# Patient Record
Sex: FEMALE | Race: WHITE | Employment: FULL TIME | URBAN - METROPOLITAN AREA
[De-identification: names, ages, dates, MRNs, and addresses within clinical notes are randomized per-mention and may not be internally consistent; named-entity substitution may affect disease eponyms.]

---

## 2020-03-20 ENCOUNTER — HOSPITAL ENCOUNTER (OUTPATIENT)
Dept: PREADMISSION TESTING | Age: 40
Discharge: HOME | End: 2020-03-20
Payer: COMMERCIAL

## 2020-03-20 VITALS
DIASTOLIC BLOOD PRESSURE: 86 MMHG | TEMPERATURE: 98.1 F | BODY MASS INDEX: 26.48 KG/M2 | SYSTOLIC BLOOD PRESSURE: 143 MMHG | WEIGHT: 185 LBS | HEIGHT: 70 IN | HEART RATE: 79 BPM

## 2020-03-20 LAB
ABO + RH BLD: NORMAL
ANION GAP SERPL CALC-SCNC: 6 MMOL/L (ref 5–15)
APPEARANCE UR: CLEAR
BACTERIA URNS QL MICRO: NEGATIVE /HPF
BILIRUB UR QL: NEGATIVE
BLOOD GROUP ANTIBODIES SERPL: NORMAL
BUN SERPL-MCNC: 13 MG/DL (ref 6–20)
BUN/CREAT SERPL: 20 (ref 12–20)
CALCIUM SERPL-MCNC: 9.5 MG/DL (ref 8.5–10.1)
CHLORIDE SERPL-SCNC: 104 MMOL/L (ref 97–108)
CO2 SERPL-SCNC: 27 MMOL/L (ref 21–32)
COLOR UR: ABNORMAL
CREAT SERPL-MCNC: 0.66 MG/DL (ref 0.55–1.02)
EPITH CASTS URNS QL MICRO: ABNORMAL /LPF
ERYTHROCYTE [DISTWIDTH] IN BLOOD BY AUTOMATED COUNT: 12.6 % (ref 11.5–14.5)
EST. AVERAGE GLUCOSE BLD GHB EST-MCNC: 97 MG/DL
GLUCOSE SERPL-MCNC: 80 MG/DL (ref 65–100)
GLUCOSE UR STRIP.AUTO-MCNC: NEGATIVE MG/DL
HBA1C MFR BLD: 5 % (ref 4–5.6)
HCG SERPL QL: NEGATIVE
HCT VFR BLD AUTO: 38.8 % (ref 35–47)
HGB BLD-MCNC: 13.4 G/DL (ref 11.5–16)
HGB UR QL STRIP: NEGATIVE
HYALINE CASTS URNS QL MICRO: ABNORMAL /LPF (ref 0–5)
INR PPP: 1 (ref 0.9–1.1)
KETONES UR QL STRIP.AUTO: 40 MG/DL
LEUKOCYTE ESTERASE UR QL STRIP.AUTO: NEGATIVE
MCH RBC QN AUTO: 34.3 PG (ref 26–34)
MCHC RBC AUTO-ENTMCNC: 34.5 G/DL (ref 30–36.5)
MCV RBC AUTO: 99.2 FL (ref 80–99)
NITRITE UR QL STRIP.AUTO: NEGATIVE
NRBC # BLD: 0 K/UL (ref 0–0.01)
NRBC BLD-RTO: 0 PER 100 WBC
PH UR STRIP: 6.5 [PH] (ref 5–8)
PLATELET # BLD AUTO: 289 K/UL (ref 150–400)
PMV BLD AUTO: 9.7 FL (ref 8.9–12.9)
POTASSIUM SERPL-SCNC: 3.8 MMOL/L (ref 3.5–5.1)
PROT UR STRIP-MCNC: NEGATIVE MG/DL
PROTHROMBIN TIME: 10.3 SEC (ref 9–11.1)
RBC # BLD AUTO: 3.91 M/UL (ref 3.8–5.2)
RBC #/AREA URNS HPF: ABNORMAL /HPF (ref 0–5)
SODIUM SERPL-SCNC: 137 MMOL/L (ref 136–145)
SP GR UR REFRACTOMETRY: 1.02
SPECIMEN EXP DATE BLD: NORMAL
UA: UC IF INDICATED,UAUC: ABNORMAL
UROBILINOGEN UR QL STRIP.AUTO: 1 EU/DL (ref 0.2–1)
WBC # BLD AUTO: 6 K/UL (ref 3.6–11)
WBC URNS QL MICRO: ABNORMAL /HPF (ref 0–4)

## 2020-03-20 PROCEDURE — 86900 BLOOD TYPING SEROLOGIC ABO: CPT

## 2020-03-20 PROCEDURE — 85610 PROTHROMBIN TIME: CPT

## 2020-03-20 PROCEDURE — 81001 URINALYSIS AUTO W/SCOPE: CPT

## 2020-03-20 PROCEDURE — 85027 COMPLETE CBC AUTOMATED: CPT

## 2020-03-20 PROCEDURE — 83036 HEMOGLOBIN GLYCOSYLATED A1C: CPT

## 2020-03-20 PROCEDURE — 84703 CHORIONIC GONADOTROPIN ASSAY: CPT

## 2020-03-20 PROCEDURE — 80048 BASIC METABOLIC PNL TOTAL CA: CPT

## 2020-03-20 RX ORDER — DEXTROAMPHETAMINE SACCHARATE, AMPHETAMINE ASPARTATE, DEXTROAMPHETAMINE SULFATE AND AMPHETAMINE SULFATE 5; 5; 5; 5 MG/1; MG/1; MG/1; MG/1
30 TABLET ORAL DAILY
COMMUNITY

## 2020-03-20 RX ORDER — ESCITALOPRAM OXALATE 10 MG/1
20 TABLET ORAL
COMMUNITY

## 2020-03-20 RX ORDER — LANOLIN ALCOHOL/MO/W.PET/CERES
100 CREAM (GRAM) TOPICAL DAILY
COMMUNITY
End: 2020-06-04

## 2020-03-20 RX ORDER — LOSARTAN POTASSIUM 50 MG/1
50 TABLET ORAL DAILY
COMMUNITY

## 2020-03-20 RX ORDER — BACLOFEN 20 MG
500 TABLET ORAL DAILY
COMMUNITY
End: 2020-06-04

## 2020-03-20 NOTE — PERIOP NOTES
PREOPERATIVE INSTRUCTIONS REVIEWED WITH PATIENT. PATIENT GIVEN HIBICLENS. INSTRUCTIONS [REVIEWED/   ON USE OF HIBICLENS. PATIENT GIVEN SSI INFECTION FAQ SHEET, INFORMATION SHEET ON DIABETIC TREATMENT CENTER AS WELL AS HAND WASHING TIPS SHEETS. MRSA/MSSA TREATMENT INSTRUCTION SHEET GIVEN WITH AN EXPLANATION TO PATIENT THAT THEY WILL BE NOTIFIED IF TREATMENT INSTRUCTIONS NEED TO BE INITIATED. PATIENT WAS GIVEN THE OPPORTUNITY TO ASK QUESTIONS ON THE INFORMATION PROVIDED.

## 2020-03-21 LAB
BACTERIA SPEC CULT: NORMAL
BACTERIA SPEC CULT: NORMAL
SERVICE CMNT-IMP: NORMAL

## 2020-03-23 PROBLEM — Z22.321 MSSA (METHICILLIN-SUSCEPTIBLE STAPH AUREUS) CARRIER: Status: ACTIVE | Noted: 2020-03-23

## 2020-03-23 RX ORDER — MUPIROCIN 20 MG/G
OINTMENT TOPICAL 2 TIMES DAILY
Qty: 22 G | Refills: 0 | Status: SHIPPED | OUTPATIENT
Start: 2020-03-23 | End: 2020-06-15

## 2020-03-23 NOTE — ADVANCED PRACTICE NURSE
Patient was called (identity confirmed with 2 patient identifiers) and informed of positive MSSA, instructed to obtain mupirocin prescription and Chlorhexidine liquid soap from pharmacy per protocol. Written instructions given at time of Preadmission Testing were reinforced with patient. Patient was given an opportunity to have questions answered and contact information if needed. Surgery postponed for now d/t COVID-19. Patient advised to follow above protocol prior to planned surgery date when known.

## 2020-03-23 NOTE — PERIOP NOTES
MRSA CULTURE + MSSA, CALLED TO   OF [facility] FOR DR. CRISTINA. PAT PHONE NUMBER GIVEN FOR RETURN CALL IF NEEDED.     CHART TO Mohan Ortega NP

## 2020-06-04 ENCOUNTER — HOSPITAL ENCOUNTER (OUTPATIENT)
Dept: PREADMISSION TESTING | Age: 40
Discharge: HOME | End: 2020-06-04
Payer: COMMERCIAL

## 2020-06-04 VITALS
HEART RATE: 90 BPM | HEIGHT: 70 IN | BODY MASS INDEX: 25.13 KG/M2 | WEIGHT: 175.5 LBS | DIASTOLIC BLOOD PRESSURE: 85 MMHG | RESPIRATION RATE: 20 BRPM | SYSTOLIC BLOOD PRESSURE: 128 MMHG | TEMPERATURE: 98 F

## 2020-06-04 LAB
ABO + RH BLD: NORMAL
ANION GAP SERPL CALC-SCNC: 7 MMOL/L (ref 5–15)
APPEARANCE UR: CLEAR
BACTERIA URNS QL MICRO: NEGATIVE /HPF
BILIRUB UR QL: NEGATIVE
BLOOD GROUP ANTIBODIES SERPL: NORMAL
BUN SERPL-MCNC: 13 MG/DL (ref 6–20)
BUN/CREAT SERPL: 18 (ref 12–20)
CALCIUM SERPL-MCNC: 9.8 MG/DL (ref 8.5–10.1)
CHLORIDE SERPL-SCNC: 105 MMOL/L (ref 97–108)
CO2 SERPL-SCNC: 26 MMOL/L (ref 21–32)
COLOR UR: ABNORMAL
CREAT SERPL-MCNC: 0.71 MG/DL (ref 0.55–1.02)
EPITH CASTS URNS QL MICRO: ABNORMAL /LPF
ERYTHROCYTE [DISTWIDTH] IN BLOOD BY AUTOMATED COUNT: 12.2 % (ref 11.5–14.5)
EST. AVERAGE GLUCOSE BLD GHB EST-MCNC: 97 MG/DL
GLUCOSE SERPL-MCNC: 91 MG/DL (ref 65–100)
GLUCOSE UR STRIP.AUTO-MCNC: NEGATIVE MG/DL
HBA1C MFR BLD: 5 % (ref 4–5.6)
HCG SERPL QL: NEGATIVE
HCT VFR BLD AUTO: 40.9 % (ref 35–47)
HGB BLD-MCNC: 14.6 G/DL (ref 11.5–16)
HGB UR QL STRIP: NEGATIVE
HYALINE CASTS URNS QL MICRO: ABNORMAL /LPF (ref 0–5)
INR PPP: 1 (ref 0.9–1.1)
KETONES UR QL STRIP.AUTO: NEGATIVE MG/DL
LEUKOCYTE ESTERASE UR QL STRIP.AUTO: NEGATIVE
MCH RBC QN AUTO: 34.6 PG (ref 26–34)
MCHC RBC AUTO-ENTMCNC: 35.7 G/DL (ref 30–36.5)
MCV RBC AUTO: 96.9 FL (ref 80–99)
NITRITE UR QL STRIP.AUTO: NEGATIVE
NRBC # BLD: 0 K/UL (ref 0–0.01)
NRBC BLD-RTO: 0 PER 100 WBC
PH UR STRIP: >8.5 [PH] (ref 5–8)
PLATELET # BLD AUTO: 321 K/UL (ref 150–400)
PMV BLD AUTO: 9.4 FL (ref 8.9–12.9)
POTASSIUM SERPL-SCNC: 3.8 MMOL/L (ref 3.5–5.1)
PROT UR STRIP-MCNC: NEGATIVE MG/DL
PROTHROMBIN TIME: 10.2 SEC (ref 9–11.1)
RBC # BLD AUTO: 4.22 M/UL (ref 3.8–5.2)
RBC #/AREA URNS HPF: ABNORMAL /HPF (ref 0–5)
SODIUM SERPL-SCNC: 138 MMOL/L (ref 136–145)
SP GR UR REFRACTOMETRY: 1.02 (ref 1–1.03)
SPECIMEN EXP DATE BLD: NORMAL
UA: UC IF INDICATED,UAUC: ABNORMAL
UROBILINOGEN UR QL STRIP.AUTO: 1 EU/DL (ref 0.2–1)
WBC # BLD AUTO: 7.4 K/UL (ref 3.6–11)
WBC URNS QL MICRO: ABNORMAL /HPF (ref 0–4)

## 2020-06-04 PROCEDURE — 85610 PROTHROMBIN TIME: CPT

## 2020-06-04 PROCEDURE — 83036 HEMOGLOBIN GLYCOSYLATED A1C: CPT

## 2020-06-04 PROCEDURE — 86900 BLOOD TYPING SEROLOGIC ABO: CPT

## 2020-06-04 PROCEDURE — 81001 URINALYSIS AUTO W/SCOPE: CPT

## 2020-06-04 PROCEDURE — 80048 BASIC METABOLIC PNL TOTAL CA: CPT

## 2020-06-04 PROCEDURE — 85027 COMPLETE CBC AUTOMATED: CPT

## 2020-06-04 PROCEDURE — 84703 CHORIONIC GONADOTROPIN ASSAY: CPT

## 2020-06-04 RX ORDER — FLUTICASONE PROPIONATE 50 MCG
2 SPRAY, SUSPENSION (ML) NASAL AS NEEDED
COMMUNITY

## 2020-06-04 RX ORDER — FEXOFENADINE HYDROCHLORIDE AND PSEUDOEPHEDRINE HYDROCHLORIDE 180; 240 MG/1; MG/1
1 TABLET, FILM COATED, EXTENDED RELEASE ORAL AS NEEDED
COMMUNITY
End: 2022-03-01

## 2020-06-04 NOTE — PERIOP NOTES
Preoperative instructions reviewed with patient. Patient given CHG soap. Instructions reviewed on use of CHG soap. Patient given SSI infection FAQ sheet. MRSA/MSSA treatment instruction sheet given with an explanation to patient that they will be notified if treatment instructions need to be initiated. Patient was given opportunity to ask questions on the information provided.

## 2020-06-05 LAB
BACTERIA SPEC CULT: NORMAL
BACTERIA SPEC CULT: NORMAL
SERVICE CMNT-IMP: NORMAL

## 2020-06-08 RX ORDER — CEFAZOLIN SODIUM/WATER 2 G/20 ML
2 SYRINGE (ML) INTRAVENOUS ONCE
Status: CANCELLED | OUTPATIENT
Start: 2020-06-15 | End: 2020-06-15

## 2020-06-08 NOTE — H&P
Olena Singleton  : 1980  Undefined / Language: Onur Castro / Race: White  Female      History of Present Illness   The patient is a 44year old female who presents with a complaint of MRI results. Patient presents today to discuss treatment options for her arthritic right knee.  She has had prior arthroscopy.  She has had multiple steroid and hyaluronic acid injections.  She is done an exercise program and physical therapy. [place] arthroscopy showed high-grade degeneration of her lateral knee joint.  She had a recent MRI which confirmed this. Problem List/Past Medical   REVIEW OF SYSTEMS: Systems were reviewed by the provider.    Tear of medial meniscus of right knee, sequela (905.7  S83.241S)      Allergies  Zithromax *MACROLIDES*    Allergies Reconciled      Medication History   Adderall  (Oral) Specific strength unknown - Active. Lexapro  (Oral) Specific strength unknown - Active. Losartan Potassium  (Oral) Specific strength unknown - Active. Medications Reconciled     Other Problems   Unspecified Diagnosis          Review of Systems  General Not Present- Chills and Fatigue. Skin Not Present- Bruising, Pallor and Skin Color Changes. Respiratory Not Present- Cough and Difficulty Breathing. Cardiovascular Not Present- Chest Pain, Fainting / Blacking Out and Rapid Heart Rate. Musculoskeletal Present- Joint Pain. Not Present- Decreased Range of Motion and Joint Swelling. Neurological Not Present- Dysesthesia, Paresthesias and Weakness In Extremities. Hematology Not Present- Abnormal Bleeding, Blood Clots and Petechiae. Vitals  Weight: 200 lb   Height: 70 in   Weight was reported by patient. Height was reported by patient. Body Surface Area: 2.09 m²   Body Mass Index: 28.7 kg/m²          Physical Exam  Musculoskeletal  Global Assessment  Examination of related systems reveals - well-developed, well-nourished, in no acute distress, alert and oriented x 3.  Right Lower Extremity - Note: Hip was examined and has painless range of motion with negative impingement and apprehension sign. Straight leg raise and femoral nerve stretch test are negative. Lower extremity has palpable dorsalis pedis pulse. Skin is intact and foot is sensate and well-perfused. Knee exam exhibits increased valgus compared to the contralateral side probably about 5 degrees. She has laxity of her lateral collateral based on the valgus deformity. MCL is intact. ACL PCL intact. Knee range of motion 0 to 130 degrees. Jen's testing is positive laterally. She has mild patellofemoral joint crepitation. Motor testing reveals 5 out of 5 strength of the hip flexors, quads, ankle plantar flexors, and ankle dorsiflexors. Assessment & Plan     Primary osteoarthritis of right knee (715.16  M17.11)  Impression: Valgus DJD of the right knee. Bone-on-bone. Discussed options. A biologic solution would be a osteotomy. An arthroplasty solution would be a lateral uni-. All questions were answered. She is going to get back with us if she decides to proceed with a lateral uni-. If she does I would schedule this as an arthroscopy with lateral unicompartmental arthroplasty. [facility] implant. Valgus DJD of the right knee. Bone-on-bone. Discussed options. A biologic solution would be a osteotomy. An arthroplasty solution would be a lateral uni-. All questions were answered. She is going to get back with us if she decides to proceed with a lateral uni-. If she does I would schedule this as an arthroscopy with lateral unicompartmental arthroplasty. [address] implant. Current Plans  Pt Education - How to [facility] using Patient Portal and 3rd Party Apps: discussed with patient and provided information. Pt Education - Educational materials were provided.: discussed with patient and provided information.     REVIEW OF SYSTEMS: Systems were reviewed by the provider.(V49.9)

## 2020-06-11 ENCOUNTER — HOSPITAL ENCOUNTER (OUTPATIENT)
Dept: PREADMISSION TESTING | Age: 40
Discharge: HOME | End: 2020-06-11
Payer: COMMERCIAL

## 2020-06-11 DIAGNOSIS — Z20.822 ENCOUNTER FOR LABORATORY TESTING FOR COVID-19 VIRUS: ICD-10-CM

## 2020-06-11 PROCEDURE — 87635 SARS-COV-2 COVID-19 AMP PRB: CPT

## 2020-06-12 LAB — SARS-COV-2, COV2NT: NOT DETECTED

## 2020-06-15 ENCOUNTER — ANESTHESIA EVENT (OUTPATIENT)
Dept: SURGERY | Age: 40
End: 2020-06-15
Payer: COMMERCIAL

## 2020-06-15 ENCOUNTER — ANESTHESIA (OUTPATIENT)
Dept: SURGERY | Age: 40
End: 2020-06-15
Payer: COMMERCIAL

## 2020-06-15 ENCOUNTER — HOSPITAL ENCOUNTER (OUTPATIENT)
Age: 40
Discharge: HOME HEALTH | End: 2020-06-15
Attending: ORTHOPAEDIC SURGERY | Admitting: ORTHOPAEDIC SURGERY
Payer: COMMERCIAL

## 2020-06-15 VITALS
OXYGEN SATURATION: 97 % | BODY MASS INDEX: 25.05 KG/M2 | WEIGHT: 175 LBS | HEIGHT: 70 IN | SYSTOLIC BLOOD PRESSURE: 125 MMHG | RESPIRATION RATE: 16 BRPM | DIASTOLIC BLOOD PRESSURE: 75 MMHG | HEART RATE: 82 BPM | TEMPERATURE: 98 F

## 2020-06-15 DIAGNOSIS — M17.11 PRIMARY OSTEOARTHRITIS OF RIGHT KNEE: Primary | ICD-10-CM

## 2020-06-15 DIAGNOSIS — Z96.651 S/P TOTAL KNEE ARTHROPLASTY, RIGHT: ICD-10-CM

## 2020-06-15 LAB
GLUCOSE BLD STRIP.AUTO-MCNC: 104 MG/DL (ref 65–100)
HCG UR QL: NEGATIVE
SERVICE CMNT-IMP: ABNORMAL

## 2020-06-15 PROCEDURE — C1713 ANCHOR/SCREW BN/BN,TIS/BN: HCPCS | Performed by: ORTHOPAEDIC SURGERY

## 2020-06-15 PROCEDURE — 77030000032 HC CUF TRNQT ZIMM -B: Performed by: ORTHOPAEDIC SURGERY

## 2020-06-15 PROCEDURE — 74011250636 HC RX REV CODE- 250/636: Performed by: ANESTHESIOLOGY

## 2020-06-15 PROCEDURE — 77030018846 HC SOL IRR STRL H20 ICUM -A: Performed by: ORTHOPAEDIC SURGERY

## 2020-06-15 PROCEDURE — 77030031139 HC SUT VCRL2 J&J -A: Performed by: ORTHOPAEDIC SURGERY

## 2020-06-15 PROCEDURE — 77030018673: Performed by: ORTHOPAEDIC SURGERY

## 2020-06-15 PROCEDURE — 81025 URINE PREGNANCY TEST: CPT

## 2020-06-15 PROCEDURE — 77030008462 HC STPLR SKN PROX J&J -A: Performed by: ORTHOPAEDIC SURGERY

## 2020-06-15 PROCEDURE — 77030005513 HC CATH URETH FOL11 MDII -B: Performed by: ORTHOPAEDIC SURGERY

## 2020-06-15 PROCEDURE — 77030038552 HC DRN WND MDII -A: Performed by: ORTHOPAEDIC SURGERY

## 2020-06-15 PROCEDURE — 77030011640 HC PAD GRND REM COVD -A: Performed by: ORTHOPAEDIC SURGERY

## 2020-06-15 PROCEDURE — 74011250637 HC RX REV CODE- 250/637: Performed by: ANESTHESIOLOGY

## 2020-06-15 PROCEDURE — 74011000258 HC RX REV CODE- 258: Performed by: ORTHOPAEDIC SURGERY

## 2020-06-15 PROCEDURE — 74011250636 HC RX REV CODE- 250/636: Performed by: NURSE ANESTHETIST, CERTIFIED REGISTERED

## 2020-06-15 PROCEDURE — 77030006813 HC BLD SAW RECIP STRY -C: Performed by: ORTHOPAEDIC SURGERY

## 2020-06-15 PROCEDURE — 74011000258 HC RX REV CODE- 258: Performed by: NURSE ANESTHETIST, CERTIFIED REGISTERED

## 2020-06-15 PROCEDURE — 77030014125 HC TY EPDRL BBMI -B: Performed by: ANESTHESIOLOGY

## 2020-06-15 PROCEDURE — 77030022296: Performed by: ORTHOPAEDIC SURGERY

## 2020-06-15 PROCEDURE — 77030006822 HC BLD SAW SAG BRSM -B: Performed by: ORTHOPAEDIC SURGERY

## 2020-06-15 PROCEDURE — 76210000017 HC OR PH I REC 1.5 TO 2 HR: Performed by: ORTHOPAEDIC SURGERY

## 2020-06-15 PROCEDURE — 74011000250 HC RX REV CODE- 250: Performed by: NURSE ANESTHETIST, CERTIFIED REGISTERED

## 2020-06-15 PROCEDURE — C9290 INJ, BUPIVACAINE LIPOSOME: HCPCS | Performed by: ORTHOPAEDIC SURGERY

## 2020-06-15 PROCEDURE — 74011000250 HC RX REV CODE- 250: Performed by: ANESTHESIOLOGY

## 2020-06-15 PROCEDURE — 77030010783 HC BOWL MX BN CEM J&J -B: Performed by: ORTHOPAEDIC SURGERY

## 2020-06-15 PROCEDURE — 77030006784 HC BLD SAW OSC MCRA -B: Performed by: ORTHOPAEDIC SURGERY

## 2020-06-15 PROCEDURE — 76010000172 HC OR TIME 2.5 TO 3 HR INTENSV-TIER 1: Performed by: ORTHOPAEDIC SURGERY

## 2020-06-15 PROCEDURE — 82962 GLUCOSE BLOOD TEST: CPT

## 2020-06-15 PROCEDURE — 77030002916 HC SUT ETHLN J&J -A: Performed by: ORTHOPAEDIC SURGERY

## 2020-06-15 PROCEDURE — 77030039497 HC CST PAD STERILE CHCS -A: Performed by: ORTHOPAEDIC SURGERY

## 2020-06-15 PROCEDURE — 97530 THERAPEUTIC ACTIVITIES: CPT

## 2020-06-15 PROCEDURE — C1776 JOINT DEVICE (IMPLANTABLE): HCPCS | Performed by: ORTHOPAEDIC SURGERY

## 2020-06-15 PROCEDURE — 74011250637 HC RX REV CODE- 250/637: Performed by: ORTHOPAEDIC SURGERY

## 2020-06-15 PROCEDURE — 74011250636 HC RX REV CODE- 250/636: Performed by: ORTHOPAEDIC SURGERY

## 2020-06-15 PROCEDURE — 77030040922 HC BLNKT HYPOTHRM STRY -A

## 2020-06-15 PROCEDURE — 97116 GAIT TRAINING THERAPY: CPT

## 2020-06-15 PROCEDURE — 77030027138 HC INCENT SPIROMETER -A

## 2020-06-15 PROCEDURE — 77030003601 HC NDL NRV BLK BBMI -A

## 2020-06-15 PROCEDURE — 77030008496 HC TBNG ARTHSC IRR S&N -B: Performed by: ORTHOPAEDIC SURGERY

## 2020-06-15 PROCEDURE — 99218 HC RM OBSERVATION: CPT

## 2020-06-15 PROCEDURE — 77030019905 HC CATH URETH INTMIT MDII -A: Performed by: ORTHOPAEDIC SURGERY

## 2020-06-15 PROCEDURE — 97161 PT EVAL LOW COMPLEX 20 MIN: CPT

## 2020-06-15 PROCEDURE — 77030018836 HC SOL IRR NACL ICUM -A: Performed by: ORTHOPAEDIC SURGERY

## 2020-06-15 PROCEDURE — 76060000036 HC ANESTHESIA 2.5 TO 3 HR: Performed by: ORTHOPAEDIC SURGERY

## 2020-06-15 PROCEDURE — 77030002933 HC SUT MCRYL J&J -A: Performed by: ORTHOPAEDIC SURGERY

## 2020-06-15 PROCEDURE — 77030035236 HC SUT PDS STRATFX BARB J&J -B: Performed by: ORTHOPAEDIC SURGERY

## 2020-06-15 PROCEDURE — 74011000250 HC RX REV CODE- 250

## 2020-06-15 PROCEDURE — 77030028907 HC WRP KNEE WO BGS SOLM -B

## 2020-06-15 PROCEDURE — 77030040361 HC SLV COMPR DVT MDII -B

## 2020-06-15 PROCEDURE — 77030010788: Performed by: ORTHOPAEDIC SURGERY

## 2020-06-15 PROCEDURE — 77030006783 HC BLD SAW OSC MCRA -A: Performed by: ORTHOPAEDIC SURGERY

## 2020-06-15 PROCEDURE — 77030012935 HC DRSG AQUACEL BMS -B: Performed by: ORTHOPAEDIC SURGERY

## 2020-06-15 PROCEDURE — 74011000250 HC RX REV CODE- 250: Performed by: ORTHOPAEDIC SURGERY

## 2020-06-15 DEVICE — IMPLANTABLE DEVICE
Type: IMPLANTABLE DEVICE | Site: KNEE | Status: FUNCTIONAL
Brand: PERSONA® NATURAL TIBIA®

## 2020-06-15 DEVICE — SMARTSET GHV GENTAMICIN HIGH VISCOSITY BONE CEMENT 40G
Type: IMPLANTABLE DEVICE | Site: KNEE | Status: FUNCTIONAL
Brand: SMARTSET

## 2020-06-15 DEVICE — KNEE K1 TOT HEMI STD CEM IMPL CAPPED K1 SN: Type: IMPLANTABLE DEVICE | Status: FUNCTIONAL

## 2020-06-15 DEVICE — IMPLANTABLE DEVICE
Type: IMPLANTABLE DEVICE | Site: KNEE | Status: FUNCTIONAL
Brand: PERSONA® VIVACIT-E®

## 2020-06-15 DEVICE — IMPLANTABLE DEVICE
Type: IMPLANTABLE DEVICE | Site: KNEE | Status: FUNCTIONAL
Brand: PERSONA™

## 2020-06-15 RX ORDER — OXYCODONE HYDROCHLORIDE 10 MG/1
10 TABLET ORAL
Qty: 60 TAB | Refills: 0 | Status: SHIPPED | OUTPATIENT
Start: 2020-06-15 | End: 2020-06-25

## 2020-06-15 RX ORDER — ASPIRIN 325 MG
325 TABLET, DELAYED RELEASE (ENTERIC COATED) ORAL 2 TIMES DAILY
Status: DISCONTINUED | OUTPATIENT
Start: 2020-06-15 | End: 2020-06-15 | Stop reason: HOSPADM

## 2020-06-15 RX ORDER — ONDANSETRON 2 MG/ML
4 INJECTION INTRAMUSCULAR; INTRAVENOUS AS NEEDED
Status: DISCONTINUED | OUTPATIENT
Start: 2020-06-15 | End: 2020-06-15 | Stop reason: HOSPADM

## 2020-06-15 RX ORDER — SODIUM CHLORIDE 9 MG/ML
INJECTION, SOLUTION INTRAVENOUS
Status: DISCONTINUED | OUTPATIENT
Start: 2020-06-15 | End: 2020-06-15 | Stop reason: HOSPADM

## 2020-06-15 RX ORDER — HYDROXYZINE HYDROCHLORIDE 10 MG/1
10 TABLET, FILM COATED ORAL
Status: DISCONTINUED | OUTPATIENT
Start: 2020-06-15 | End: 2020-06-15 | Stop reason: HOSPADM

## 2020-06-15 RX ORDER — ACETAMINOPHEN 500 MG
1000 TABLET ORAL EVERY 6 HOURS
Status: DISCONTINUED | OUTPATIENT
Start: 2020-06-15 | End: 2020-06-15 | Stop reason: HOSPADM

## 2020-06-15 RX ORDER — ROPIVACAINE HYDROCHLORIDE 5 MG/ML
150 INJECTION, SOLUTION EPIDURAL; INFILTRATION; PERINEURAL AS NEEDED
Status: DISCONTINUED | OUTPATIENT
Start: 2020-06-15 | End: 2020-06-15 | Stop reason: HOSPADM

## 2020-06-15 RX ORDER — SODIUM CHLORIDE, SODIUM LACTATE, POTASSIUM CHLORIDE, CALCIUM CHLORIDE 600; 310; 30; 20 MG/100ML; MG/100ML; MG/100ML; MG/100ML
INJECTION, SOLUTION INTRAVENOUS
Status: DISCONTINUED | OUTPATIENT
Start: 2020-06-15 | End: 2020-06-15 | Stop reason: HOSPADM

## 2020-06-15 RX ORDER — OXYCODONE HYDROCHLORIDE 5 MG/1
10 TABLET ORAL
Status: DISCONTINUED | OUTPATIENT
Start: 2020-06-15 | End: 2020-06-15 | Stop reason: HOSPADM

## 2020-06-15 RX ORDER — LIDOCAINE HYDROCHLORIDE 10 MG/ML
0.1 INJECTION, SOLUTION EPIDURAL; INFILTRATION; INTRACAUDAL; PERINEURAL AS NEEDED
Status: DISCONTINUED | OUTPATIENT
Start: 2020-06-15 | End: 2020-06-15 | Stop reason: HOSPADM

## 2020-06-15 RX ORDER — CEFAZOLIN SODIUM/WATER 2 G/20 ML
2 SYRINGE (ML) INTRAVENOUS EVERY 8 HOURS
Status: DISCONTINUED | OUTPATIENT
Start: 2020-06-15 | End: 2020-06-15 | Stop reason: HOSPADM

## 2020-06-15 RX ORDER — FENTANYL CITRATE 50 UG/ML
25 INJECTION, SOLUTION INTRAMUSCULAR; INTRAVENOUS
Status: DISCONTINUED | OUTPATIENT
Start: 2020-06-15 | End: 2020-06-15 | Stop reason: HOSPADM

## 2020-06-15 RX ORDER — PROPOFOL 10 MG/ML
INJECTION, EMULSION INTRAVENOUS AS NEEDED
Status: DISCONTINUED | OUTPATIENT
Start: 2020-06-15 | End: 2020-06-15 | Stop reason: HOSPADM

## 2020-06-15 RX ORDER — SODIUM CHLORIDE 0.9 % (FLUSH) 0.9 %
5-40 SYRINGE (ML) INJECTION AS NEEDED
Status: DISCONTINUED | OUTPATIENT
Start: 2020-06-15 | End: 2020-06-15 | Stop reason: HOSPADM

## 2020-06-15 RX ORDER — KETOROLAC TROMETHAMINE 30 MG/ML
30 INJECTION, SOLUTION INTRAMUSCULAR; INTRAVENOUS EVERY 6 HOURS
Status: DISCONTINUED | OUTPATIENT
Start: 2020-06-15 | End: 2020-06-15 | Stop reason: HOSPADM

## 2020-06-15 RX ORDER — NALOXONE HYDROCHLORIDE 0.4 MG/ML
0.4 INJECTION, SOLUTION INTRAMUSCULAR; INTRAVENOUS; SUBCUTANEOUS AS NEEDED
Status: DISCONTINUED | OUTPATIENT
Start: 2020-06-15 | End: 2020-06-15 | Stop reason: HOSPADM

## 2020-06-15 RX ORDER — MIDAZOLAM HYDROCHLORIDE 1 MG/ML
INJECTION, SOLUTION INTRAMUSCULAR; INTRAVENOUS AS NEEDED
Status: DISCONTINUED | OUTPATIENT
Start: 2020-06-15 | End: 2020-06-15 | Stop reason: HOSPADM

## 2020-06-15 RX ORDER — FENTANYL CITRATE 50 UG/ML
50 INJECTION, SOLUTION INTRAMUSCULAR; INTRAVENOUS AS NEEDED
Status: DISCONTINUED | OUTPATIENT
Start: 2020-06-15 | End: 2020-06-15 | Stop reason: HOSPADM

## 2020-06-15 RX ORDER — MIDAZOLAM HYDROCHLORIDE 1 MG/ML
0.5 INJECTION, SOLUTION INTRAMUSCULAR; INTRAVENOUS
Status: DISCONTINUED | OUTPATIENT
Start: 2020-06-15 | End: 2020-06-15 | Stop reason: HOSPADM

## 2020-06-15 RX ORDER — CEFAZOLIN SODIUM/WATER 2 G/20 ML
2 SYRINGE (ML) INTRAVENOUS ONCE
Status: COMPLETED | OUTPATIENT
Start: 2020-06-15 | End: 2020-06-15

## 2020-06-15 RX ORDER — SODIUM CHLORIDE 9 MG/ML
125 INJECTION, SOLUTION INTRAVENOUS CONTINUOUS
Status: DISCONTINUED | OUTPATIENT
Start: 2020-06-15 | End: 2020-06-15 | Stop reason: HOSPADM

## 2020-06-15 RX ORDER — OXYCODONE HYDROCHLORIDE 5 MG/1
5 TABLET ORAL AS NEEDED
Status: DISCONTINUED | OUTPATIENT
Start: 2020-06-15 | End: 2020-06-15 | Stop reason: HOSPADM

## 2020-06-15 RX ORDER — ROPIVACAINE HYDROCHLORIDE 5 MG/ML
INJECTION, SOLUTION EPIDURAL; INFILTRATION; PERINEURAL
Status: COMPLETED | OUTPATIENT
Start: 2020-06-15 | End: 2020-06-15

## 2020-06-15 RX ORDER — EPHEDRINE SULFATE/0.9% NACL/PF 50 MG/5 ML
SYRINGE (ML) INTRAVENOUS AS NEEDED
Status: DISCONTINUED | OUTPATIENT
Start: 2020-06-15 | End: 2020-06-15 | Stop reason: HOSPADM

## 2020-06-15 RX ORDER — PROPOFOL 10 MG/ML
INJECTION, EMULSION INTRAVENOUS
Status: DISCONTINUED | OUTPATIENT
Start: 2020-06-15 | End: 2020-06-15 | Stop reason: HOSPADM

## 2020-06-15 RX ORDER — AMOXICILLIN 250 MG
1 CAPSULE ORAL 2 TIMES DAILY
Qty: 30 TAB | Refills: 0 | Status: SHIPPED | OUTPATIENT
Start: 2020-06-15 | End: 2021-02-26

## 2020-06-15 RX ORDER — DIPHENHYDRAMINE HYDROCHLORIDE 50 MG/ML
12.5 INJECTION, SOLUTION INTRAMUSCULAR; INTRAVENOUS AS NEEDED
Status: DISCONTINUED | OUTPATIENT
Start: 2020-06-15 | End: 2020-06-15 | Stop reason: HOSPADM

## 2020-06-15 RX ORDER — ASPIRIN 325 MG
325 TABLET ORAL 2 TIMES DAILY
Qty: 60 TAB | Refills: 0 | Status: SHIPPED | OUTPATIENT
Start: 2020-06-15 | End: 2021-02-26

## 2020-06-15 RX ORDER — OXYCODONE HYDROCHLORIDE 5 MG/1
15 TABLET ORAL
Status: DISCONTINUED | OUTPATIENT
Start: 2020-06-15 | End: 2020-06-15 | Stop reason: HOSPADM

## 2020-06-15 RX ORDER — MIDAZOLAM HYDROCHLORIDE 1 MG/ML
1 INJECTION, SOLUTION INTRAMUSCULAR; INTRAVENOUS AS NEEDED
Status: DISCONTINUED | OUTPATIENT
Start: 2020-06-15 | End: 2020-06-15 | Stop reason: HOSPADM

## 2020-06-15 RX ORDER — ESCITALOPRAM OXALATE 10 MG/1
10 TABLET ORAL
Status: DISCONTINUED | OUTPATIENT
Start: 2020-06-16 | End: 2020-06-15 | Stop reason: HOSPADM

## 2020-06-15 RX ORDER — SODIUM CHLORIDE, SODIUM LACTATE, POTASSIUM CHLORIDE, CALCIUM CHLORIDE 600; 310; 30; 20 MG/100ML; MG/100ML; MG/100ML; MG/100ML
100 INJECTION, SOLUTION INTRAVENOUS CONTINUOUS
Status: DISCONTINUED | OUTPATIENT
Start: 2020-06-15 | End: 2020-06-15 | Stop reason: HOSPADM

## 2020-06-15 RX ORDER — BUPIVACAINE HYDROCHLORIDE 5 MG/ML
INJECTION, SOLUTION EPIDURAL; INTRACAUDAL
Status: COMPLETED | OUTPATIENT
Start: 2020-06-15 | End: 2020-06-15

## 2020-06-15 RX ORDER — DEXTROAMPHETAMINE SACCHARATE, AMPHETAMINE ASPARTATE, DEXTROAMPHETAMINE SULFATE AND AMPHETAMINE SULFATE 2.5; 2.5; 2.5; 2.5 MG/1; MG/1; MG/1; MG/1
20 TABLET ORAL DAILY
Status: DISCONTINUED | OUTPATIENT
Start: 2020-06-16 | End: 2020-06-15 | Stop reason: HOSPADM

## 2020-06-15 RX ORDER — SODIUM CHLORIDE, SODIUM LACTATE, POTASSIUM CHLORIDE, CALCIUM CHLORIDE 600; 310; 30; 20 MG/100ML; MG/100ML; MG/100ML; MG/100ML
1000 INJECTION, SOLUTION INTRAVENOUS CONTINUOUS
Status: DISCONTINUED | OUTPATIENT
Start: 2020-06-15 | End: 2020-06-15 | Stop reason: HOSPADM

## 2020-06-15 RX ORDER — OXYCODONE HYDROCHLORIDE 5 MG/1
10-15 TABLET ORAL
Qty: 60 TAB | Refills: 0 | Status: SHIPPED | OUTPATIENT
Start: 2020-06-15 | End: 2020-06-15

## 2020-06-15 RX ORDER — MORPHINE SULFATE 10 MG/ML
2 INJECTION, SOLUTION INTRAMUSCULAR; INTRAVENOUS
Status: DISCONTINUED | OUTPATIENT
Start: 2020-06-15 | End: 2020-06-15 | Stop reason: HOSPADM

## 2020-06-15 RX ORDER — LIDOCAINE HYDROCHLORIDE 20 MG/ML
INJECTION, SOLUTION EPIDURAL; INFILTRATION; INTRACAUDAL; PERINEURAL AS NEEDED
Status: DISCONTINUED | OUTPATIENT
Start: 2020-06-15 | End: 2020-06-15 | Stop reason: HOSPADM

## 2020-06-15 RX ORDER — SODIUM CHLORIDE 9 MG/ML
25 INJECTION, SOLUTION INTRAVENOUS CONTINUOUS
Status: DISCONTINUED | OUTPATIENT
Start: 2020-06-15 | End: 2020-06-15 | Stop reason: HOSPADM

## 2020-06-15 RX ORDER — SODIUM CHLORIDE 0.9 % (FLUSH) 0.9 %
5-40 SYRINGE (ML) INJECTION EVERY 8 HOURS
Status: DISCONTINUED | OUTPATIENT
Start: 2020-06-15 | End: 2020-06-15 | Stop reason: HOSPADM

## 2020-06-15 RX ORDER — HYDROMORPHONE HYDROCHLORIDE 1 MG/ML
0.2 INJECTION, SOLUTION INTRAMUSCULAR; INTRAVENOUS; SUBCUTANEOUS
Status: ACTIVE | OUTPATIENT
Start: 2020-06-15 | End: 2020-06-15

## 2020-06-15 RX ORDER — GLYCOPYRROLATE 0.2 MG/ML
INJECTION INTRAMUSCULAR; INTRAVENOUS
Status: COMPLETED
Start: 2020-06-15 | End: 2020-06-15

## 2020-06-15 RX ORDER — ONDANSETRON 2 MG/ML
INJECTION INTRAMUSCULAR; INTRAVENOUS AS NEEDED
Status: DISCONTINUED | OUTPATIENT
Start: 2020-06-15 | End: 2020-06-15 | Stop reason: HOSPADM

## 2020-06-15 RX ORDER — ACETAMINOPHEN 325 MG/1
650 TABLET ORAL ONCE
Status: COMPLETED | OUTPATIENT
Start: 2020-06-15 | End: 2020-06-15

## 2020-06-15 RX ORDER — ASPIRIN 325 MG
325 TABLET, DELAYED RELEASE (ENTERIC COATED) ORAL 2 TIMES DAILY
Qty: 60 TAB | Refills: 0 | Status: SHIPPED | OUTPATIENT
Start: 2020-06-15 | End: 2021-02-26

## 2020-06-15 RX ORDER — POLYETHYLENE GLYCOL 3350 17 G/17G
17 POWDER, FOR SOLUTION ORAL DAILY
Status: DISCONTINUED | OUTPATIENT
Start: 2020-06-16 | End: 2020-06-15 | Stop reason: HOSPADM

## 2020-06-15 RX ORDER — ONDANSETRON 2 MG/ML
4 INJECTION INTRAMUSCULAR; INTRAVENOUS
Status: DISCONTINUED | OUTPATIENT
Start: 2020-06-15 | End: 2020-06-15 | Stop reason: HOSPADM

## 2020-06-15 RX ORDER — GLYCOPYRROLATE 0.2 MG/ML
INJECTION INTRAMUSCULAR; INTRAVENOUS AS NEEDED
Status: DISCONTINUED | OUTPATIENT
Start: 2020-06-15 | End: 2020-06-15 | Stop reason: HOSPADM

## 2020-06-15 RX ORDER — FACIAL-BODY WIPES
10 EACH TOPICAL DAILY PRN
Status: DISCONTINUED | OUTPATIENT
Start: 2020-06-17 | End: 2020-06-15 | Stop reason: HOSPADM

## 2020-06-15 RX ORDER — GLYCOPYRROLATE 0.2 MG/ML
0.2 INJECTION INTRAMUSCULAR; INTRAVENOUS ONCE
Status: COMPLETED | OUTPATIENT
Start: 2020-06-15 | End: 2020-06-15

## 2020-06-15 RX ORDER — DEXAMETHASONE SODIUM PHOSPHATE 4 MG/ML
INJECTION, SOLUTION INTRA-ARTICULAR; INTRALESIONAL; INTRAMUSCULAR; INTRAVENOUS; SOFT TISSUE AS NEEDED
Status: DISCONTINUED | OUTPATIENT
Start: 2020-06-15 | End: 2020-06-15 | Stop reason: HOSPADM

## 2020-06-15 RX ORDER — AMOXICILLIN 250 MG
1 CAPSULE ORAL 2 TIMES DAILY
Status: DISCONTINUED | OUTPATIENT
Start: 2020-06-15 | End: 2020-06-15 | Stop reason: HOSPADM

## 2020-06-15 RX ORDER — EPHEDRINE SULFATE/0.9% NACL/PF 50 MG/5 ML
5 SYRINGE (ML) INTRAVENOUS AS NEEDED
Status: DISCONTINUED | OUTPATIENT
Start: 2020-06-15 | End: 2020-06-15 | Stop reason: HOSPADM

## 2020-06-15 RX ADMIN — GLYCOPYRROLATE 0.1 MG: 0.2 INJECTION, SOLUTION INTRAMUSCULAR; INTRAVENOUS at 09:48

## 2020-06-15 RX ADMIN — PROPOFOL 75 MCG/KG/MIN: 10 INJECTION, EMULSION INTRAVENOUS at 09:30

## 2020-06-15 RX ADMIN — Medication 5 MG: at 10:31

## 2020-06-15 RX ADMIN — SODIUM CHLORIDE: 900 INJECTION, SOLUTION INTRAVENOUS at 11:22

## 2020-06-15 RX ADMIN — Medication 5 MG: at 10:43

## 2020-06-15 RX ADMIN — ASPIRIN 325 MG: 325 TABLET, DELAYED RELEASE ORAL at 17:09

## 2020-06-15 RX ADMIN — SODIUM CHLORIDE, POTASSIUM CHLORIDE, SODIUM LACTATE AND CALCIUM CHLORIDE 1000 ML: 600; 310; 30; 20 INJECTION, SOLUTION INTRAVENOUS at 07:57

## 2020-06-15 RX ADMIN — PROPOFOL 30 MG: 10 INJECTION, EMULSION INTRAVENOUS at 09:45

## 2020-06-15 RX ADMIN — CEFAZOLIN SODIUM 2 G: 300 INJECTION, POWDER, LYOPHILIZED, FOR SOLUTION INTRAVENOUS at 17:09

## 2020-06-15 RX ADMIN — TRANEXAMIC ACID 1 G: 100 INJECTION, SOLUTION INTRAVENOUS at 09:50

## 2020-06-15 RX ADMIN — KETOROLAC TROMETHAMINE 30 MG: 30 INJECTION, SOLUTION INTRAMUSCULAR at 15:05

## 2020-06-15 RX ADMIN — SODIUM CHLORIDE, POTASSIUM CHLORIDE, SODIUM LACTATE AND CALCIUM CHLORIDE: 600; 310; 30; 20 INJECTION, SOLUTION INTRAVENOUS at 09:28

## 2020-06-15 RX ADMIN — Medication 5 MG: at 10:55

## 2020-06-15 RX ADMIN — MIDAZOLAM HYDROCHLORIDE 1 MG: 2 INJECTION, SOLUTION INTRAMUSCULAR; INTRAVENOUS at 08:21

## 2020-06-15 RX ADMIN — MIDAZOLAM 2 MG: 1 INJECTION INTRAMUSCULAR; INTRAVENOUS at 09:30

## 2020-06-15 RX ADMIN — OXYCODONE HYDROCHLORIDE 10 MG: 5 TABLET ORAL at 17:09

## 2020-06-15 RX ADMIN — DEXAMETHASONE SODIUM PHOSPHATE 4 MG: 4 INJECTION, SOLUTION INTRAMUSCULAR; INTRAVENOUS at 09:58

## 2020-06-15 RX ADMIN — GLYCOPYRROLATE 0.2 MG: 0.2 INJECTION INTRAMUSCULAR; INTRAVENOUS at 13:05

## 2020-06-15 RX ADMIN — Medication 2 G: at 09:45

## 2020-06-15 RX ADMIN — BUPIVACAINE HYDROCHLORIDE 10 MG: 5 INJECTION, SOLUTION EPIDURAL; INTRACAUDAL; PERINEURAL at 09:39

## 2020-06-15 RX ADMIN — ACETAMINOPHEN 650 MG: 325 TABLET ORAL at 07:56

## 2020-06-15 RX ADMIN — PROPOFOL 50 MG: 10 INJECTION, EMULSION INTRAVENOUS at 11:52

## 2020-06-15 RX ADMIN — ONDANSETRON HYDROCHLORIDE 4 MG: 2 INJECTION, SOLUTION INTRAMUSCULAR; INTRAVENOUS at 09:58

## 2020-06-15 RX ADMIN — PROPOFOL 30 MG: 10 INJECTION, EMULSION INTRAVENOUS at 10:05

## 2020-06-15 RX ADMIN — FENTANYL CITRATE 50 MCG: 50 INJECTION INTRAMUSCULAR; INTRAVENOUS at 08:21

## 2020-06-15 RX ADMIN — MIDAZOLAM HYDROCHLORIDE 1 MG: 2 INJECTION, SOLUTION INTRAMUSCULAR; INTRAVENOUS at 08:22

## 2020-06-15 RX ADMIN — SODIUM CHLORIDE 125 ML/HR: 900 INJECTION, SOLUTION INTRAVENOUS at 12:15

## 2020-06-15 RX ADMIN — GLYCOPYRROLATE 0.2 MG: 0.2 INJECTION, SOLUTION INTRAMUSCULAR; INTRAVENOUS at 13:05

## 2020-06-15 RX ADMIN — PROPOFOL 50 MG: 10 INJECTION, EMULSION INTRAVENOUS at 11:51

## 2020-06-15 RX ADMIN — ACETAMINOPHEN 1000 MG: 500 TABLET ORAL at 15:04

## 2020-06-15 RX ADMIN — ROPIVACAINE HYDROCHLORIDE 30 ML: 5 INJECTION, SOLUTION EPIDURAL; INFILTRATION; PERINEURAL at 09:04

## 2020-06-15 RX ADMIN — PROPOFOL 30 MG: 10 INJECTION, EMULSION INTRAVENOUS at 09:35

## 2020-06-15 RX ADMIN — LIDOCAINE HYDROCHLORIDE 40 MG: 20 INJECTION, SOLUTION EPIDURAL; INFILTRATION; INTRACAUDAL; PERINEURAL at 09:30

## 2020-06-15 NOTE — PROGRESS NOTES
Physical Therapy    PHYSICAL THERAPY EVALUATION/DISCHARGE  Patient: Carli Gaston (41 y.o. female)  Date: 6/15/2020  Primary Diagnosis: Osteoarthritis of right knee [M17.11]  Osteoarthritis of right knee [M17.11]  Procedure(s) (LRB):  RIGHT TOTAL KNEE ARTHROPLASTY (Right) Day of Surgery   Precautions:   Fall, WBAT      ASSESSMENT  Based on the objective data described below, the patient presents with  impairment in functional mobility, activity tolerance and balance s/p R TKA. PLOF: Independent with ADLs and IADLs. Lives with significant other in one story handicapped accessible home with ramps to enter. Patient is cleared for discharge from PT standpoint. Patient instructed NOT to get up from bed, chair or commode without calling for assistance. Initiated post-TKA  Exercise protocol and patient has same in written, illustrated form. Discharge instructions with regards to post-op home program reviewed. Patient and demonstrated understanding. Functional Outcome Measure: The patient scored 80/100 on the Barthel outcome measure which is indicative of minimal impaired ability to care for basic self-needs/dependency on others. Other factors to consider for discharge: Motivated/A & O x 4/Supportive Family/Independent PLOF     Further skilled acute physical therapy is not indicated at this time. PLAN :  Recommendation for discharge: (in order for the patient to meet his/her long term goals)  Physical therapy at least 2 days/week in the home     This discharge recommendation:  Has been made in collaboration with the attending provider and/or case management    IF patient discharges home will need the following DME: patient owns DME required for discharge       SUBJECTIVE:   Patient stated I want to go home today. 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 Personal factors and/or comorbidities impacting plan of care: Motivated/A & O x 4/Supportive Family/Independent PLOF    Home Situation  Home Environment: Private residence  # Steps to Enter: 0  Wheelchair Ramp: No  One/Two Story Residence: One story  Living Alone: No  Support Systems: Spouse/Significant Other/Partner  Patient Expects to be Discharged to[de-identified] Private residence  Current DME Used/Available at Home: Niya Ilan, rolling  Tub or Shower Type: Tub/Shower combination    EXAMINATION/PRESENTATION/DECISION MAKING:   Critical Behavior:  Neurologic State: Appropriate for age  Orientation Level: Oriented X4  Cognition: Appropriate for age attention/concentration       Range Of Motion:  AROM: Generally decreased, functional           PROM: Generally decreased, functional           Strength:    Strength: Generally decreased, functional                    Tone & Sensation:   Tone: Normal              Sensation: Intact               Coordination:  Coordination: Within functional limits  Vision:      Functional Mobility:  Bed Mobility:  Rolling: Supervision  Supine to Sit: Supervision  Sit to Supine: Supervision  Scooting: Supervision  Transfers:  Sit to Stand: Supervision  Stand to Sit: Supervision                       Balance:   Sitting: Intact  Standing: Impaired; Without support  Standing - Static: Good;Constant support  Standing - Dynamic : Good;Constant support  Ambulation/Gait Training:  Distance (ft): 75 Feet (ft)  Assistive Device: Walker, rolling;Gait belt  Ambulation - Level of Assistance: Stand-by assistance        Gait Abnormalities: Antalgic  Right Side Weight Bearing: As tolerated     Base of Support: Widened;Shift to left  Stance: Right decreased  Speed/Brigette: Slow  Step Length: Left shortened  Swing Pattern: Right asymmetrical                  Stairs: Therapeutic Exercises: Ankle Pumps  Quad sets (5 second hold)  X 10 reps every hour   Heel slides x 10    Functional Measure:  Barthel Index:    Bathin  Bladder: 10  Bowels: 10  Groomin  Dressin  Feeding: 10  Mobility: 15  Stairs: 0  Toilet Use: 10  Transfer (Bed to Chair and Back): 15  Total: 80/100       The Barthel ADL Index: Guidelines  1. The index should be used as a record of what a patient does, not as a record of what a patient could do. 2. The main aim is to establish degree of independence from any help, physical or verbal, however minor and for whatever reason. 3. The need for supervision renders the patient not independent. 4. A patient's performance should be established using the best available evidence. Asking the patient, friends/relatives and nurses are the usual sources, but direct observation and common sense are also important. However direct testing is not needed. 5. Usually the patient's performance over the preceding 24-48 hours is important, but occasionally longer periods will be relevant. 6. Middle categories imply that the patient supplies over 50 per cent of the effort. 7. Use of aids to be independent is allowed. Gibran Verma., Barthel, D.WSteve (2951). Functional evaluation: the Barthel Index. [address] [facility] (14)2. LUIS Bean, Ian Toure., Theodora Yu., [place], [address] [place] [address] (). Measuring the change indisability after inpatient rehabilitation; comparison of the responsiveness of the Barthel Index and Functional [place] Measure. Journal of Neurology, Neurosurgery, and Psychiatry, 66(4), 282-342.   QUYEN Gatica, Mark op LINCOLN [place], Martha Gramajo M.A. (2004.) Assessment of post-stroke quality of life in cost-effectiveness studies: The usefulness of the Barthel Index and the EuroQoL-5D. Quality of Life Research, 15, 134-46          Physical Therapy Evaluation Charge Determination   History Examination Presentation Decision-Making   LOW Complexity : Zero comorbidities / personal factors that will impact the outcome / POC LOW Complexity : 1-2 Standardized tests and measures addressing body structure, function, activity limitation and / or participation in recreation  LOW Complexity : Stable, uncomplicated  LOW Complexity : FOTO score of       Based on the above components, the patient evaluation is determined to be of the following complexity level: LOW     Pain Ratin/10    Activity Tolerance:   Good  Please refer to the flowsheet for vital signs taken during this treatment. Vitals:    06/15/20 1358 06/15/20 1502 06/15/20 1510 06/15/20 1527   BP: 121/78 (!) 135/92 134/79 131/82   BP 1 Location: Right arm Right arm Right arm Right arm   BP Patient Position: Post activity At rest At rest;Head of bed elevated (Comment degrees); Other (comment)  Comment: 35 degrees During activity; Sitting   Pulse: 69 85 80 82   Resp: 16 16 15    Temp: 98 °F (36.7 °C) 97.9 °F (36.6 °C)     SpO2: 96% 97% 98%    Weight:       Height:         After treatment patient left in no apparent distress:   Supine in bed, Call bell within reach, Side rails x 3 and nurse notified. COMMUNICATION/EDUCATION:   The patients plan of care was discussed with: Registered nurse. Fall prevention education was provided and the patient/caregiver indicated understanding., Patient/family have participated as able in goal setting and plan of care. and Patient/family agree to work toward stated goals and plan of care.     Thank you for this referral.  Nellie Treviño   Time Calculation: 30 mins

## 2020-06-15 NOTE — DISCHARGE INSTRUCTIONS
Discharge Instructions Knee Replacement  Dr. Linnette Anderson    Patient Name: Korey Toney  Date of procedure: 6/15/2020   Procedure: Procedure(s):  RIGHT TOTAL KNEE ARTHROPLASTY  Surgeon: Radha Guevara) and Role:     * Chesley Ahumada, MD - Primary  PCP: Sabine Maria MD  Date of discharge: 6/15/20      Follow up appointments   Follow up with Dr. Linnette Anderson in 3 weeks. Call [phone] to make an appointment.  If home health has been arranged for you the agency will contact you to arrange dates/times for visits. Please call them if you do not hear from them within 24 hours after you are discharged    When to call your Orthopaedic Surgeon: Call [phone]. If you call after 5pm or on a weekend, the on call physician will be contacted   Unrelieved pain   Signs of infection-if your incision is red; continues to have drainage; drainage has a foul odor or if you have a persistent fever over 101 degrees   Signs of a blood clot in your leg-calf pain, tenderness, redness, swelling of lower leg    When to call your Primary Care Physician:   Concerns about medical conditions such as diabetes, high blood pressure, asthma, congestive heart failure   Call if blood sugars are elevated, persistent headache or dizziness, coughing or congestion, constipation or diarrhea, burning with urination, abnormal heart rate    When to call [id number] go to the nearest emergency room   Sudden onset of chest pain, shortness of breath, difficulty breathing    Activity   Weight bearing as tolerated with walker or crutches. Refer to your patient handbook for instructions and pictures   Complete your Home Exercise Program daily as instructed by your therapist.  Refer to your handbook for instructions and pictures   Get up every one hour and walk (except at night when sleeping)   Do not drive or operate heavy machinery    Incision Care   The Aquacel (brown, waterproof) surgical dressing is to remain on your knee for 7 days.  On the 7th day have someone gently peel the dressing off by carefully lifting the edge and stretching it slightly to break the adhesive seal and leave incision open to air. You may take a shower with the Aquacel dressing in place.  You  have staples in your knee incision; they will be removed by the [facility] staff in 14 days and steri-strips will be applied. Leave the steri-strips on until they fall off   Once the Aquacel is removed, you may get your incision wet but do not submerge your incision under water in a bath tub, hot tub or swimming pool for 6 weeks after surgery. Preventing blood clots    Take Aspirin 325 mg twice a day for 1 month post op as prescribed. Pain management   Keep ice wrap in place except when walking; changing gel packs every 4 hours   Lie down and elevate your leg on pillows for about 30 minutes after walking to decrease swelling and pain   Do ankle pumps (10 repetitions) every hour while awake and get up frequently to walk   Take narcotic pain pill as prescribed if needed. Take with food; avoid alcohol while taking pain medication. Decrease the amount of narcotic pain medication as your pain lessens   Do not take any over-the-counter medication except for Tylenol (acetaminophen). Please be aware that many medications contain Tylenol. We do not want you to take too much Tylenol so please use this as your guide:  o Do not take more than 3 Grams of Tylenol in a 24 hour period. (There are 1000 milligrams in one Gram  o 325mg of Tylenol per tablet (do not take more than 9 tablets in 24 hours)  o 500mg of Tylenol per tablet (do not take more than 6 tablets in 24 hours)    Diet   Resume usual diet; drink plenty of fluids; eat foods high in fiber   Take over-the-counter stool softeners and laxatives to prevent constipation.

## 2020-06-15 NOTE — PROGRESS NOTES
[facility] did not accept this pt for home health. CM sent a referral to [facility]. Will follow.  Mitch Robertson

## 2020-06-15 NOTE — PERIOP NOTES
Patient pre-assessment performed in holding. Pt confirmed surgical procedure and laterality on consent, allergies, and appropriate NPO status. Allowed time for questions and concerns.

## 2020-06-15 NOTE — OP NOTES
Name: Marybeth Lorenz  MRN:  [id number]  : 1980  Age:  36 y.o. Surgery Date: 6/15/2020      OPERATIVE REPORT - RIGHT TOTAL KNEE REPLACEMENT    PREOPERATIVE DIAGNOSIS: Osteoarthritis, right knee. POSTOPERATIVE DIAGNOSIS: Osteoarthritis, right knee. PROCEDURE PERFORMED: Right total knee arthroplasty. SURGEON: Elizabeth Montiel MD    FIRST ASSISTANT:  Giles Farfan    ANESTHESIA: Spinal    PRE-OP ANTIBIOTIC: Ancef 2g    COMPLICATIONS: None. ESTIMATED BLOOD LOSS: 50 mL. SPECIMENS REMOVED: None. COMPONENTS IMPLANTED:   Implant Name Type Inv. Item Serial No.  Lot No. LRB No. Used Action   CEMENT BNE GENTAMC GHV 40GM -- SMARTSET - SNA  CEMENT BNE GENTAMC GHV 40GM -- SMARTSET NA [employer] [id number] Right 2 Implanted   SCR BNE ROE HEX FEM 2.5X25MM -- PERSONA - SNA  SCR BNE ROE HEX FEM 2.5X25MM -- PERSONA NA [employer] [id number] Right 1 Implanted   SCR BNE ROE HEX FEM 2.5X25MM -- PERSONA - SNA  SCR BNE ROE HEX FEM 2.5X25MM -- PERSONA NA [employer] [id number] Right 1 Implanted   The PersonalCaptimo Knee System Femur Cemented Cruciate Retaining (CR) Joint Component  NA [employer] [id number] Right 1 Implanted   TIB PRN NP STM 5 DEG SZ FR -- PERSONA - SNA  TIB PRN NP STM 5 DEG SZ FR -- PERSONA NA [employer] [id number] Right 1 Implanted   PAT PSN VE POLY 35MM -- PERSONA - SNA  PAT PSN VE POLY 35MM -- PERSONA NA [employer] [id number] Right 1 Implanted   INSERT ASF CR 10MM VE R 3-11EF -- PERSONA - SNA  INSERT ASF CR 10MM VE R 3-11EF -- PERSONA NA [employer] [id number] Right 1 Implanted       INDICATIONS: The patient is an 36 yrs female with progressive debilitating right knee pain due to severe osteoarthritis. Symptoms have progressed despite comprehensive conservative treatment and the patient presents for right total knee replacement. Risks, benefits, alternatives of the procedure were reviewed in detail and the patient elects to proceed.  The patient understands the risk for perioperative medical complications. DESCRIPTION OF PROCEDURE: Spinal anesthesia was initiated. Preoperative dose of antibiotic was given. Pacheco catheter was not placed. The right lower extremity was prepped and draped in the usual sterile fashion. The skin was covered with Ioban occlusive dressing. The right lower extremity was exsanguinated. Arthroscopy was performed on the right knee which showed end stage PF arthritis. Need for TKA was decided. A medial parapatellar incision was made to the right knee. The right knee was exposed and the distal femur was cut at 5 degrees distal femoral valgus. Femur was sized for a size 10. An AP cutting block was placed, rotated based on bony landmarks. Femoral cuts were completed for a size 10. The tibia was subluxed and a neutral varus/valgus proximal tibial cut was made matching the patient's slope. The meniscal remnants were removed. The posterior osteophytes were removed from the femur. Gaps were examined. The flexion and extension gaps were 10 mm. The femur was finished for a 10, tibia for a F. Trials were placed. Patella was cut and a 35 mm trial was fitted . The knee tracked well with all trial implants. The trials were then removed. Bony surfaces were drilled, lavaged, and dried. All components were cemented. Excess cement was removed. A 10 polyethylene component was placed. After the cement had fully cured, the knee was ranged and  irrigated copiously with pulsatile lavage. All surrounding soft tissues were infiltrated with local anesthetic. The arthrotomy was closed with #2 Vicryl sutures and 0 Vicryl sutures. Skin and subcutaneous tissues were irrigated and closed in standard fashion. Sterile dressing was applied. There were no complications. The procedure was a RIGHT TOTAL KNEE REPLACEMENT. A Quinton total knee construct was utilized. No specimens were obtained/sent. The patient was transferred to the recovery room in stable condition.     Anabel Bell was critical throughout the case to assist with positioning, retraction and closure. There were no other available residents or surgical assistants to assist during this procedure.       Maggy Ellington MD

## 2020-06-15 NOTE — ANESTHESIA PREPROCEDURE EVALUATION
Relevant Problems   No relevant active problems       Anesthetic History   No history of anesthetic complications            Review of Systems / Medical History  Patient summary reviewed, nursing notes reviewed and pertinent labs reviewed    Pulmonary          Smoker  Asthma        Neuro/Psych         Psychiatric history     Cardiovascular    Hypertension                   GI/Hepatic/Renal     GERD      PUD     Endo/Other        Arthritis     Other Findings            Physical Exam    Airway  Mallampati: II  TM Distance: > 6 cm  Neck ROM: normal range of motion   Mouth opening: Normal     Cardiovascular  Regular rate and rhythm,  S1 and S2 normal,  no murmur, click, rub, or gallop             Dental  No notable dental hx       Pulmonary  Breath sounds clear to auscultation               Abdominal  GI exam deferred       Other Findings            Anesthetic Plan    ASA: 2  Anesthesia type: spinal      Post-op pain plan if not by surgeon: peripheral nerve block single      Anesthetic plan and risks discussed with: Patient

## 2020-06-15 NOTE — PROGRESS NOTES
[facility] unable to set up home health for this pt. CM messaged NP Sabas Left and asked if he can write a prescription for outpt PT. He said that he will see pt prior to d/c.  Tracee Thomas

## 2020-06-15 NOTE — PROGRESS NOTES
Problem: Falls - Risk of  Goal: *Absence of Falls  Description: Document Merritt Mcnamara Fall Risk and appropriate interventions in the flowsheet.   Outcome: Progressing Towards Goal  Note: Fall Risk Interventions:  Mobility Interventions: PT Consult for mobility concerns    Mentation Interventions: Door open when patient unattended    Medication Interventions: Patient to call before getting OOB    Elimination Interventions: Call light in reach

## 2020-06-15 NOTE — ROUTINE PROCESS
Patient: Gale Sotomayor MRN: [id number]  SSN: [id number] YOB: 1980  Age: 36 y.o. Sex: female Patient is status post Procedure(s): RIGHT TOTAL KNEE ARTHROPLASTY. Surgeon(s) and Role: Teresa Wylie MD - Primary Local/Dose/Irrigation:  SEE MAR Peripheral IV 06/15/20 Left Hand (Active) Dressing/Packing:  Wound Knee Right-Dressing Type: Staples; Aquacel; Cast padding;Elastic bandage (06/15/20 1115) Splint/Cast:  ] Other:

## 2020-06-15 NOTE — PROGRESS NOTES
MAURICIO spoke with Moe Shaver with [facility] and she stated that they do not use [employer]. Per her request, MAURICIO faxed this referral to [phone]. She will call this CM when she accepts pt.  Bladimir Cuevas

## 2020-06-15 NOTE — PROGRESS NOTES
Reason for Admission:   Right total knee arthroplasty                   RUR Score:     Not available. Plan for utilizing home health:    Yes      PCP: First and Last name:  David Blanco   Name of Practice:    Are you a current patient: Yes/No: yes Approximate date of last visit: last month. Can you participate in a virtual visit with your PCP: yes                    Current Advanced Directive/Advance Care Plan: Not on file. This pt is not currently interested in pursuing an AMD.                         Transition of Care Plan:       CM met with pt to introduce her to the role of CM . This pt lives at home with her boyfriend. She said that she was independent prior to admission. She will be going home today if she is cleared by PT. CM informed pt that she has home health orders and offered her freedom of choice. She stated that she would like to use [facility]. Andrew sent a referral to [facility] via [employer]. MARGARITA Kumari,[facility]-    Observation notice provided in writing to patient and/or caregiver as well as verbal explanation of the policy. Patients who are in outpatient status also receive the Observation notice. Care Management Interventions  PCP Verified by CM:  Yes  Palliative Care Criteria Met (RRAT>21 & CHF Dx)?: No  Transition of Care Consult (CM Consult): Discharge Planning  MyChart Signup: No  Discharge Durable Medical Equipment: No  Physical Therapy Consult: Yes  Occupational Therapy Consult: No  Speech Therapy Consult: No  Current Support Network: Lives Alone(Pt lives with her boyfriend.)  Confirm Follow Up Transport: Family  The Plan for Transition of Care is Related to the Following Treatment Goals : safe d/c  The Patient and/or Patient Representative was Provided with a Choice of Provider and Agrees with the Discharge Plan?: Yes  Freedom of Choice List was Provided with Basic Dialogue that Supports the Patient's Individualized Plan of Care/Goals, Treatment Preferences and Shares the Quality Data Associated with the Providers?: Yes  The [employer] Information Provided?: No

## 2020-06-15 NOTE — PROGRESS NOTES
Paged Eveline Adkins. Case management unable to secure home health.  Left a message with Eveline Adkins.

## 2020-06-15 NOTE — ANESTHESIA POSTPROCEDURE EVALUATION
Post-Anesthesia Evaluation and Assessment    Patient: Chasity Aguilar MRN: [id number]  SSN: [id number]    YOB: 1980  Age: 36 y.o. Sex: female      I have evaluated the patient and they are stable and ready for discharge from the PACU. Cardiovascular Function/Vital Signs  Visit Vitals  /61   Pulse (!) 48   Temp 36.4 °C (97.5 °F)   Resp 15   Ht 5' 10\" (1.778 m)   Wt 79.4 kg (175 lb)   SpO2 100%   BMI 25.11 kg/m²       Patient is status post Spinal anesthesia for Procedure(s):  RIGHT TOTAL KNEE ARTHROPLASTY. Nausea/Vomiting: None    Postoperative hydration reviewed and adequate. Pain:  Pain Scale 1: Numeric (0 - 10) (06/15/20 1210)  Pain Intensity 1: 0 (06/15/20 1210)   Managed    Neurological Status:   Neuro (WDL): Exceptions to WDL (06/15/20 1210)  Neuro  Neurologic State: Appropriate for age;Eyes open spontaneously; Eyes open to voice (06/15/20 1210)  LUE Motor Response: Purposeful (06/15/20 1210)  LLE Motor Response: Pharmacologically paralyzed;Numbness (06/15/20 1210)  RUE Motor Response: Purposeful (06/15/20 1210)  RLE Motor Response: Purposeful;Numbness (06/15/20 1210)   At baseline    Mental Status, Level of Consciousness: Alert and  oriented to person, place, and time    Pulmonary Status:   O2 Device: Nasal cannula (06/15/20 1210)   Adequate oxygenation and airway patent    Complications related to anesthesia: None    Post-anesthesia assessment completed. No concerns    Signed By: Nidia Webster MD     Pricilla 15, 2020              Procedure(s):  RIGHT TOTAL KNEE ARTHROPLASTY. spinal    <BSHSIANPOST>    INITIAL Post-op Vital signs:   Vitals Value Taken Time   /77 6/15/2020 12:45 PM   Temp 36.4 °C (97.5 °F) 6/15/2020 12:10 PM   Pulse 44 6/15/2020 12:48 PM   Resp 15 6/15/2020 12:48 PM   SpO2 100 % 6/15/2020 12:48 PM   Vitals shown include unvalidated device data.

## 2020-06-15 NOTE — PROGRESS NOTES
Bedside shift change report given to Judy (oncoming nurse) by Lesley Appiah (offgoing nurse). Report included the following information SBAR, Kardex, Intake/Output and MAR.

## 2020-06-15 NOTE — ANESTHESIA PROCEDURE NOTES
Spinal Block    Performed by: Gunnar Yancey CRNA  Authorized by: Matt Joseph MD     Pre-procedure:   Indications: at surgeon's request, post-op pain management, procedure for pain and primary anesthetic  Preanesthetic Checklist: patient identified, risks and benefits discussed, anesthesia consent, site marked, patient being monitored and timeout performed    Timeout Time: 09:35          Spinal Block:   Patient Position:  Seated  Prep Region:  Lumbar  Prep: chlorhexidine      Location:  L3-4  Technique:  Single shot        Needle:   Needle Type:  Pencan  Needle Gauge:  24 G  Attempts:  2      Events: CSF confirmed, no blood with aspiration and no paresthesia        Assessment:  Insertion:  Uncomplicated  Patient tolerance:  Patient tolerated the procedure well with no immediate complications

## 2020-06-15 NOTE — PROGRESS NOTES
The Joint Replacement Discharge Education video was reviewed by the patient/family. The content was discussed utilizing teach back, questions were answered. The patient verbalized an understanding of the instructions. Patient was given instructions, prescriptions, and extra dressings. Patient acknowledged understanding of everything.

## 2020-06-15 NOTE — ANESTHESIA PROCEDURE NOTES
Peripheral Block    Start time: 6/15/2020 8:22 AM  End time: 6/15/2020 8:30 AM  Performed by: Makenzie Saleem MD  Authorized by: Makenzie Saleem MD       Pre-procedure: Indications: at surgeon's request and post-op pain management    Preanesthetic Checklist: patient identified, risks and benefits discussed, site marked, timeout performed, anesthesia consent given and patient being monitored    Timeout Time: 08:22          Block Type:   Block Type:   Adductor canal  Laterality:  Right  Monitoring:  Standard ASA monitoring, continuous pulse ox, frequent vital sign checks, heart rate, responsive to questions and oxygen  Injection Technique:  Single shot  Procedures: ultrasound guided    Patient Position: supine  Prep: chlorhexidine    Location:  Mid thigh  Needle Type:  Stimuplex  Needle Gauge:  22 G  Needle Localization:  Ultrasound guidance    Assessment:  Number of attempts:  1  Injection Assessment:  Incremental injection every 5 mL, local visualized surrounding nerve on ultrasound, negative aspiration for blood, no paresthesia, no intravascular symptoms and negative aspiration for CSF  Patient tolerance:  Patient tolerated the procedure well with no immediate complications

## 2020-06-15 NOTE — H&P
Date of Surgery Update:  Ellie Olivo was seen and examined. History and physical has been reviewed. The patient has been examined.  There have been no significant clinical changes since the completion of the originally dated History and Physical.    Signed By: Jessie Mcgrath MD     Pricilla 15, 2020 7:47 AM

## 2020-06-15 NOTE — PROGRESS NOTES
Ortho Post-Op Note    6/15/2020  2:13 PM    POD:  Day of Surgery  S/P:  Procedure(s):  RIGHT TOTAL KNEE ARTHROPLASTY    Afebrile/VSS, NAD, A&O x 3  Doing well without complaints of nausea  Pain well controlled  Calves soft/NTTP Bilaterally  Leg soft. Dressing clean and dry  Not moving lower extremities well, spinal remains effective  Neurocirculatory exam intact and within normal range. Lab Results   Component Value Date/Time    HGB 14.6 06/04/2020 10:59 AM    INR 1.0 06/04/2020 10:59 AM     Recent Labs     06/04/20  1059 03/20/20  1100   CREA 0.71 0.66   BUN 13 13     Estimated Creatinine Clearance: 113.9 mL/min (by C-G formula based on SCr of 0.71 mg/dL).     PLAN:  DVT prophylaxis:  mg bid  WBAT with PT-mobilization  Pain Control: Tylenol, toradol, oxycodone  Plan to D/C home with HH/PT later this afternoon if cleared by PT      DEL Hardwick

## 2020-06-16 NOTE — PROGRESS NOTES
MAURICIO followed up with [facility] MATHIS this am. He stated that he gave this pt a prescription for outpatient PT. The pt's boyfriend located an outpt clinic near pt;s home.  Eligio Cummings

## 2021-02-16 ENCOUNTER — TRANSCRIBE ORDER (OUTPATIENT)
Dept: REGISTRATION | Age: 41
End: 2021-02-16

## 2021-02-16 DIAGNOSIS — Z01.812 PRE-PROCEDURE LAB EXAM: Primary | ICD-10-CM

## 2021-02-26 ENCOUNTER — HOSPITAL ENCOUNTER (OUTPATIENT)
Dept: PREADMISSION TESTING | Age: 41
Discharge: HOME | End: 2021-02-26
Payer: COMMERCIAL

## 2021-02-26 ENCOUNTER — TRANSCRIBE ORDER (OUTPATIENT)
Dept: REGISTRATION | Age: 41
End: 2021-02-26

## 2021-02-26 DIAGNOSIS — Z01.812 PRE-PROCEDURE LAB EXAM: Primary | ICD-10-CM

## 2021-02-26 DIAGNOSIS — Z01.812 PRE-PROCEDURE LAB EXAM: ICD-10-CM

## 2021-02-26 PROCEDURE — U0003 INFECTIOUS AGENT DETECTION BY NUCLEIC ACID (DNA OR RNA); SEVERE ACUTE RESPIRATORY SYNDROME CORONAVIRUS 2 (SARS-COV-2) (CORONAVIRUS DISEASE [COVID-19]), AMPLIFIED PROBE TECHNIQUE, MAKING USE OF HIGH THROUGHPUT TECHNOLOGIES AS DESCRIBED BY CMS-2020-01-R: HCPCS

## 2021-02-26 RX ORDER — IBUPROFEN 200 MG
600 TABLET ORAL
COMMUNITY
End: 2022-03-15

## 2021-02-26 NOTE — PERIOP NOTES
Patient advised to purchase CHG soap if able and given instructions in use. . Preop instructions reviewed and patient verbalizes understanding of instructions. Patient has been given the opportunity to ask additional questions.

## 2021-02-27 LAB — SARS-COV-2, COV2NT: NOT DETECTED

## 2021-03-01 RX ORDER — SODIUM CHLORIDE 0.9 % (FLUSH) 0.9 %
5-40 SYRINGE (ML) INJECTION AS NEEDED
Status: CANCELLED | OUTPATIENT
Start: 2021-03-01

## 2021-03-01 RX ORDER — SODIUM CHLORIDE 0.9 % (FLUSH) 0.9 %
5-40 SYRINGE (ML) INJECTION EVERY 8 HOURS
Status: CANCELLED | OUTPATIENT
Start: 2021-03-01

## 2021-03-01 NOTE — H&P
Aminah Henderson  : 1980  Undefined / Language: [place] / Race: White  Female      History of Present Illness   The patient is a 36year old female who presents with a complaint of MRI results. Chief complaint is hip pain on the right.  We discussed her symptoms in detail. Bonnie Espinoza MRI shows degenerative labral tearing loose bodies in her hip.  She has catching and locking.  She also has a underlying dull ache.  The main problem for her is the catching and locking in her hip.  We discussed long-term issues and options for her.  Is interested and has researched hip arthroscopy as an interim to hip replacement.  Patient is very active and this limits her activities of daily living. Problem List/Past Medical  Tear of medial meniscus of right knee, sequela (905.7  S83.241S)    Aftercare following surgery (V58.89  Z48.89)    REVIEW OF SYSTEMS: Systems were reviewed by the provider.    Trochanteric bursitis of right hip (726.5  M70.61)    Labral tear of hip, degenerative (718.05  M24.159)    Lumbar radiculopathy, acute (724.4  M54.16)    Primary osteoarthritis of right knee (715.16  M17.11)      Allergies  Zithromax *MACROLIDES*    Allergies Reconciled      Medication History  oxyCODONE HCl  (10MG Tablet, 1 Oral every 4 to 6 hours as needed for pain, Taken starting 07/10/2020) Active. Adderall  (Oral) Specific strength unknown - Active. Lexapro  (Oral) Specific strength unknown - Active. Losartan Potassium  (Oral) Specific strength unknown - Active. Medications Reconciled     Other Problems   Unspecified Diagnosis    Suture and staples removed from right knee, incision was dry and clean, no drainage or redness    Status post total knee replacement, right (V43.65  Z96.651)          Review of Systems  General Not Present- Chills and Fatigue. Skin Not Present- Bruising, Pallor and Skin Color Changes. Respiratory Not Present- Cough and Difficulty Breathing.   Cardiovascular Not Present- Chest Pain, Fainting / Blacking Out and Rapid Heart Rate. Musculoskeletal Present- Joint Pain. Not Present- Decreased Range of Motion and Joint Swelling. Neurological Not Present- Dysesthesia, Paresthesias and Weakness In Extremities. Hematology Not Present- Abnormal Bleeding, Blood Clots and Petechiae. Vitals   Weight: 200 lb   Height: 70 in   Weight was reported by patient. Height was reported by patient. Body Surface Area: 2.09 m²   Body Mass Index: 28.7 kg/m²      Physical Exam  Musculoskeletal  Global Assessment  Examination of related systems reveals - well-developed, well-nourished, in no acute distress, alert and oriented x 3. Assessment & Plan    Labral tear of hip, degenerative (718.05  M24.159)    Current Plans  Pt Education - How to [address] using Patient Portal and 3rd Party Apps: discussed with patient and provided information. Pt Education - Educational materials were provided.: discussed with patient and provided information. REVIEW OF SYSTEMS: Systems were reviewed by the provider.(V49.9)      Loose body of hip, right (718.15  M24.051)  Impression: Discussed all of her options. We will see her back on the date of her surgery. Schedule her for hip arthroscopy with debridement. Questions were answered. Her expectations were set with regard to persistent achiness after surgery which I expect.

## 2021-03-02 ENCOUNTER — ANESTHESIA (OUTPATIENT)
Dept: SURGERY | Age: 41
End: 2021-03-02
Payer: COMMERCIAL

## 2021-03-02 ENCOUNTER — APPOINTMENT (OUTPATIENT)
Dept: GENERAL RADIOLOGY | Age: 41
End: 2021-03-02
Attending: ORTHOPAEDIC SURGERY
Payer: COMMERCIAL

## 2021-03-02 ENCOUNTER — HOSPITAL ENCOUNTER (OUTPATIENT)
Age: 41
Discharge: HOME | End: 2021-03-02
Attending: ORTHOPAEDIC SURGERY | Admitting: ORTHOPAEDIC SURGERY
Payer: COMMERCIAL

## 2021-03-02 ENCOUNTER — ANESTHESIA EVENT (OUTPATIENT)
Dept: SURGERY | Age: 41
End: 2021-03-02
Payer: COMMERCIAL

## 2021-03-02 VITALS
SYSTOLIC BLOOD PRESSURE: 114 MMHG | HEIGHT: 70 IN | OXYGEN SATURATION: 97 % | WEIGHT: 190 LBS | HEART RATE: 65 BPM | BODY MASS INDEX: 27.2 KG/M2 | DIASTOLIC BLOOD PRESSURE: 76 MMHG | TEMPERATURE: 97.6 F | RESPIRATION RATE: 15 BRPM

## 2021-03-02 DIAGNOSIS — M24.159 DEGENERATIVE TEAR OF ACETABULAR LABRUM: Primary | ICD-10-CM

## 2021-03-02 PROBLEM — M24.051 LOOSE BODY IN RIGHT HIP: Status: ACTIVE | Noted: 2021-03-02

## 2021-03-02 LAB — HCG UR QL: NEGATIVE

## 2021-03-02 PROCEDURE — 74011250637 HC RX REV CODE- 250/637: Performed by: ANESTHESIOLOGY

## 2021-03-02 PROCEDURE — 77030029352 HC BLD FLL RAD SAMURAI STRY -C: Performed by: ORTHOPAEDIC SURGERY

## 2021-03-02 PROCEDURE — 74011250636 HC RX REV CODE- 250/636: Performed by: NURSE ANESTHETIST, CERTIFIED REGISTERED

## 2021-03-02 PROCEDURE — 74011250636 HC RX REV CODE- 250/636: Performed by: PHYSICIAN ASSISTANT

## 2021-03-02 PROCEDURE — C1713 ANCHOR/SCREW BN/BN,TIS/BN: HCPCS | Performed by: ORTHOPAEDIC SURGERY

## 2021-03-02 PROCEDURE — 77030008496 HC TBNG ARTHSC IRR S&N -B: Performed by: ORTHOPAEDIC SURGERY

## 2021-03-02 PROCEDURE — 77030002916 HC SUT ETHLN J&J -A: Performed by: ORTHOPAEDIC SURGERY

## 2021-03-02 PROCEDURE — 74011000250 HC RX REV CODE- 250: Performed by: NURSE ANESTHETIST, CERTIFIED REGISTERED

## 2021-03-02 PROCEDURE — 74011250636 HC RX REV CODE- 250/636: Performed by: ANESTHESIOLOGY

## 2021-03-02 PROCEDURE — 76060000036 HC ANESTHESIA 2.5 TO 3 HR: Performed by: ORTHOPAEDIC SURGERY

## 2021-03-02 PROCEDURE — 74011250636 HC RX REV CODE- 250/636: Performed by: ORTHOPAEDIC SURGERY

## 2021-03-02 PROCEDURE — 77030003396 HC NDL ARTHSCP S&N -B: Performed by: ORTHOPAEDIC SURGERY

## 2021-03-02 PROCEDURE — 76010000131 HC OR TIME 2 TO 2.5 HR: Performed by: ORTHOPAEDIC SURGERY

## 2021-03-02 PROCEDURE — 74011000250 HC RX REV CODE- 250: Performed by: ORTHOPAEDIC SURGERY

## 2021-03-02 PROCEDURE — 76210000021 HC REC RM PH II 0.5 TO 1 HR: Performed by: ORTHOPAEDIC SURGERY

## 2021-03-02 PROCEDURE — 74011250637 HC RX REV CODE- 250/637: Performed by: PHYSICIAN ASSISTANT

## 2021-03-02 PROCEDURE — 77030018835 HC SOL IRR LR ICUM -A: Performed by: ORTHOPAEDIC SURGERY

## 2021-03-02 PROCEDURE — 77030002922 HC SUT FBRWRE ARTH -B: Performed by: ORTHOPAEDIC SURGERY

## 2021-03-02 PROCEDURE — 77030016677 HC BUR SHV ABRD S&N -B: Performed by: ORTHOPAEDIC SURGERY

## 2021-03-02 PROCEDURE — 77030038012 HC WND COBLATN S&N -F: Performed by: ORTHOPAEDIC SURGERY

## 2021-03-02 PROCEDURE — 76210000017 HC OR PH I REC 1.5 TO 2 HR: Performed by: ORTHOPAEDIC SURGERY

## 2021-03-02 PROCEDURE — 77030008684 HC TU ET CUF COVD -B: Performed by: ANESTHESIOLOGY

## 2021-03-02 PROCEDURE — 77030040922 HC BLNKT HYPOTHRM STRY -A

## 2021-03-02 PROCEDURE — 77030028574 HC ACC KT DISP ARTH -C: Performed by: ORTHOPAEDIC SURGERY

## 2021-03-02 PROCEDURE — 77030016555 HC BLD SHV INCIS4 S&N -B: Performed by: ORTHOPAEDIC SURGERY

## 2021-03-02 PROCEDURE — 2709999900 HC NON-CHARGEABLE SUPPLY: Performed by: ORTHOPAEDIC SURGERY

## 2021-03-02 PROCEDURE — 77030003666 HC NDL SPINAL BD -A: Performed by: ORTHOPAEDIC SURGERY

## 2021-03-02 PROCEDURE — 77030029353 HC SUT PASS ENDOSC NANOPS STRY -C: Performed by: ORTHOPAEDIC SURGERY

## 2021-03-02 PROCEDURE — 77030018673: Performed by: ORTHOPAEDIC SURGERY

## 2021-03-02 PROCEDURE — 81025 URINE PREGNANCY TEST: CPT

## 2021-03-02 PROCEDURE — 77030026438 HC STYL ET INTUB CARD -A: Performed by: ANESTHESIOLOGY

## 2021-03-02 PROCEDURE — 77030008462 HC STPLR SKN PROX J&J -A: Performed by: ORTHOPAEDIC SURGERY

## 2021-03-02 DEVICE — ANCHOR SUT L15.5MM DIA2.9MM HIP BIOCOMPOSITE KNOTLESS: Type: IMPLANTABLE DEVICE | Site: HIP | Status: FUNCTIONAL

## 2021-03-02 RX ORDER — ESCITALOPRAM OXALATE 10 MG/1
20 TABLET ORAL
Status: CANCELLED | OUTPATIENT
Start: 2021-03-02

## 2021-03-02 RX ORDER — ROCURONIUM BROMIDE 10 MG/ML
INJECTION, SOLUTION INTRAVENOUS AS NEEDED
Status: DISCONTINUED | OUTPATIENT
Start: 2021-03-02 | End: 2021-03-02 | Stop reason: HOSPADM

## 2021-03-02 RX ORDER — ONDANSETRON 2 MG/ML
4 INJECTION INTRAMUSCULAR; INTRAVENOUS
Status: DISCONTINUED | OUTPATIENT
Start: 2021-03-02 | End: 2021-03-02 | Stop reason: HOSPADM

## 2021-03-02 RX ORDER — LOSARTAN POTASSIUM 50 MG/1
50 TABLET ORAL DAILY
Status: CANCELLED | OUTPATIENT
Start: 2021-03-02

## 2021-03-02 RX ORDER — ACETAMINOPHEN 325 MG/1
650 TABLET ORAL ONCE
Status: COMPLETED | OUTPATIENT
Start: 2021-03-02 | End: 2021-03-02

## 2021-03-02 RX ORDER — KETOROLAC TROMETHAMINE 30 MG/ML
INJECTION, SOLUTION INTRAMUSCULAR; INTRAVENOUS AS NEEDED
Status: DISCONTINUED | OUTPATIENT
Start: 2021-03-02 | End: 2021-03-02 | Stop reason: HOSPADM

## 2021-03-02 RX ORDER — FENTANYL CITRATE 50 UG/ML
50 INJECTION, SOLUTION INTRAMUSCULAR; INTRAVENOUS AS NEEDED
Status: DISCONTINUED | OUTPATIENT
Start: 2021-03-02 | End: 2021-03-02 | Stop reason: HOSPADM

## 2021-03-02 RX ORDER — ONDANSETRON 2 MG/ML
INJECTION INTRAMUSCULAR; INTRAVENOUS AS NEEDED
Status: DISCONTINUED | OUTPATIENT
Start: 2021-03-02 | End: 2021-03-02 | Stop reason: HOSPADM

## 2021-03-02 RX ORDER — HYDROMORPHONE HYDROCHLORIDE 2 MG/ML
INJECTION, SOLUTION INTRAMUSCULAR; INTRAVENOUS; SUBCUTANEOUS AS NEEDED
Status: DISCONTINUED | OUTPATIENT
Start: 2021-03-02 | End: 2021-03-02 | Stop reason: HOSPADM

## 2021-03-02 RX ORDER — BUPIVACAINE HYDROCHLORIDE AND EPINEPHRINE 5; 5 MG/ML; UG/ML
INJECTION, SOLUTION EPIDURAL; INTRACAUDAL; PERINEURAL AS NEEDED
Status: DISCONTINUED | OUTPATIENT
Start: 2021-03-02 | End: 2021-03-02 | Stop reason: HOSPADM

## 2021-03-02 RX ORDER — GUAIFENESIN 100 MG/5ML
81 LIQUID (ML) ORAL DAILY
Status: DISCONTINUED | OUTPATIENT
Start: 2021-03-02 | End: 2021-03-02 | Stop reason: HOSPADM

## 2021-03-02 RX ORDER — NALOXONE HYDROCHLORIDE 0.4 MG/ML
0.4 INJECTION, SOLUTION INTRAMUSCULAR; INTRAVENOUS; SUBCUTANEOUS AS NEEDED
Status: DISCONTINUED | OUTPATIENT
Start: 2021-03-02 | End: 2021-03-02 | Stop reason: HOSPADM

## 2021-03-02 RX ORDER — DEXAMETHASONE SODIUM PHOSPHATE 4 MG/ML
INJECTION, SOLUTION INTRA-ARTICULAR; INTRALESIONAL; INTRAMUSCULAR; INTRAVENOUS; SOFT TISSUE AS NEEDED
Status: DISCONTINUED | OUTPATIENT
Start: 2021-03-02 | End: 2021-03-02 | Stop reason: HOSPADM

## 2021-03-02 RX ORDER — SODIUM CHLORIDE 0.9 % (FLUSH) 0.9 %
5-40 SYRINGE (ML) INJECTION EVERY 8 HOURS
Status: DISCONTINUED | OUTPATIENT
Start: 2021-03-02 | End: 2021-03-02 | Stop reason: HOSPADM

## 2021-03-02 RX ORDER — OXYCODONE AND ACETAMINOPHEN 5; 325 MG/1; MG/1
1 TABLET ORAL
Status: DISCONTINUED | OUTPATIENT
Start: 2021-03-02 | End: 2021-03-02 | Stop reason: HOSPADM

## 2021-03-02 RX ORDER — SODIUM CHLORIDE 0.9 % (FLUSH) 0.9 %
5-40 SYRINGE (ML) INJECTION AS NEEDED
Status: DISCONTINUED | OUTPATIENT
Start: 2021-03-02 | End: 2021-03-02 | Stop reason: HOSPADM

## 2021-03-02 RX ORDER — MIDAZOLAM HYDROCHLORIDE 1 MG/ML
1 INJECTION, SOLUTION INTRAMUSCULAR; INTRAVENOUS AS NEEDED
Status: DISCONTINUED | OUTPATIENT
Start: 2021-03-02 | End: 2021-03-02 | Stop reason: HOSPADM

## 2021-03-02 RX ORDER — SODIUM CHLORIDE, SODIUM LACTATE, POTASSIUM CHLORIDE, CALCIUM CHLORIDE 600; 310; 30; 20 MG/100ML; MG/100ML; MG/100ML; MG/100ML
125 INJECTION, SOLUTION INTRAVENOUS CONTINUOUS
Status: DISCONTINUED | OUTPATIENT
Start: 2021-03-02 | End: 2021-03-02 | Stop reason: HOSPADM

## 2021-03-02 RX ORDER — LIDOCAINE HYDROCHLORIDE 10 MG/ML
0.1 INJECTION, SOLUTION EPIDURAL; INFILTRATION; INTRACAUDAL; PERINEURAL AS NEEDED
Status: DISCONTINUED | OUTPATIENT
Start: 2021-03-02 | End: 2021-03-02 | Stop reason: HOSPADM

## 2021-03-02 RX ORDER — MIDAZOLAM HYDROCHLORIDE 1 MG/ML
INJECTION, SOLUTION INTRAMUSCULAR; INTRAVENOUS AS NEEDED
Status: DISCONTINUED | OUTPATIENT
Start: 2021-03-02 | End: 2021-03-02 | Stop reason: HOSPADM

## 2021-03-02 RX ORDER — FENTANYL CITRATE 50 UG/ML
25 INJECTION, SOLUTION INTRAMUSCULAR; INTRAVENOUS
Status: COMPLETED | OUTPATIENT
Start: 2021-03-02 | End: 2021-03-02

## 2021-03-02 RX ORDER — FENTANYL CITRATE 50 UG/ML
INJECTION, SOLUTION INTRAMUSCULAR; INTRAVENOUS AS NEEDED
Status: DISCONTINUED | OUTPATIENT
Start: 2021-03-02 | End: 2021-03-02 | Stop reason: HOSPADM

## 2021-03-02 RX ORDER — DIPHENHYDRAMINE HYDROCHLORIDE 50 MG/ML
12.5 INJECTION, SOLUTION INTRAMUSCULAR; INTRAVENOUS
Status: DISCONTINUED | OUTPATIENT
Start: 2021-03-02 | End: 2021-03-02 | Stop reason: HOSPADM

## 2021-03-02 RX ORDER — MORPHINE SULFATE 2 MG/ML
2 INJECTION, SOLUTION INTRAMUSCULAR; INTRAVENOUS
Status: DISCONTINUED | OUTPATIENT
Start: 2021-03-02 | End: 2021-03-02 | Stop reason: HOSPADM

## 2021-03-02 RX ORDER — SUCCINYLCHOLINE CHLORIDE 20 MG/ML
INJECTION INTRAMUSCULAR; INTRAVENOUS AS NEEDED
Status: DISCONTINUED | OUTPATIENT
Start: 2021-03-02 | End: 2021-03-02 | Stop reason: HOSPADM

## 2021-03-02 RX ORDER — HYDROMORPHONE HYDROCHLORIDE 1 MG/ML
1 INJECTION, SOLUTION INTRAMUSCULAR; INTRAVENOUS; SUBCUTANEOUS
Status: DISCONTINUED | OUTPATIENT
Start: 2021-03-02 | End: 2021-03-02 | Stop reason: HOSPADM

## 2021-03-02 RX ORDER — OXYCODONE HYDROCHLORIDE 5 MG/1
5 TABLET ORAL AS NEEDED
Status: DISCONTINUED | OUTPATIENT
Start: 2021-03-02 | End: 2021-03-02 | Stop reason: HOSPADM

## 2021-03-02 RX ORDER — ACETAMINOPHEN 325 MG/1
650 TABLET ORAL
Status: DISCONTINUED | OUTPATIENT
Start: 2021-03-02 | End: 2021-03-02 | Stop reason: HOSPADM

## 2021-03-02 RX ORDER — HYDROMORPHONE HYDROCHLORIDE 1 MG/ML
0.2 INJECTION, SOLUTION INTRAMUSCULAR; INTRAVENOUS; SUBCUTANEOUS
Status: DISCONTINUED | OUTPATIENT
Start: 2021-03-02 | End: 2021-03-02 | Stop reason: HOSPADM

## 2021-03-02 RX ORDER — SODIUM CHLORIDE 9 MG/ML
125 INJECTION, SOLUTION INTRAVENOUS CONTINUOUS
Status: DISCONTINUED | OUTPATIENT
Start: 2021-03-02 | End: 2021-03-02 | Stop reason: HOSPADM

## 2021-03-02 RX ORDER — MIDAZOLAM HYDROCHLORIDE 1 MG/ML
0.5 INJECTION, SOLUTION INTRAMUSCULAR; INTRAVENOUS
Status: DISCONTINUED | OUTPATIENT
Start: 2021-03-02 | End: 2021-03-02 | Stop reason: HOSPADM

## 2021-03-02 RX ORDER — SODIUM CHLORIDE 9 MG/ML
25 INJECTION, SOLUTION INTRAVENOUS CONTINUOUS
Status: DISCONTINUED | OUTPATIENT
Start: 2021-03-02 | End: 2021-03-02 | Stop reason: HOSPADM

## 2021-03-02 RX ORDER — GUAIFENESIN 100 MG/5ML
81 LIQUID (ML) ORAL DAILY
Qty: 30 TAB | Refills: 0 | Status: SHIPPED | OUTPATIENT
Start: 2021-03-02 | End: 2022-03-01

## 2021-03-02 RX ORDER — OXYCODONE HYDROCHLORIDE 5 MG/1
5 TABLET ORAL
Qty: 30 TAB | Refills: 0 | Status: SHIPPED | OUTPATIENT
Start: 2021-03-02 | End: 2021-03-09

## 2021-03-02 RX ORDER — ONDANSETRON 2 MG/ML
4 INJECTION INTRAMUSCULAR; INTRAVENOUS AS NEEDED
Status: DISCONTINUED | OUTPATIENT
Start: 2021-03-02 | End: 2021-03-02 | Stop reason: HOSPADM

## 2021-03-02 RX ORDER — SODIUM CHLORIDE, SODIUM LACTATE, POTASSIUM CHLORIDE, CALCIUM CHLORIDE 600; 310; 30; 20 MG/100ML; MG/100ML; MG/100ML; MG/100ML
25 INJECTION, SOLUTION INTRAVENOUS CONTINUOUS
Status: DISCONTINUED | OUTPATIENT
Start: 2021-03-02 | End: 2021-03-02 | Stop reason: HOSPADM

## 2021-03-02 RX ORDER — LIDOCAINE HYDROCHLORIDE 20 MG/ML
INJECTION, SOLUTION EPIDURAL; INFILTRATION; INTRACAUDAL; PERINEURAL AS NEEDED
Status: DISCONTINUED | OUTPATIENT
Start: 2021-03-02 | End: 2021-03-02 | Stop reason: HOSPADM

## 2021-03-02 RX ORDER — DIPHENHYDRAMINE HYDROCHLORIDE 50 MG/ML
12.5 INJECTION, SOLUTION INTRAMUSCULAR; INTRAVENOUS AS NEEDED
Status: DISCONTINUED | OUTPATIENT
Start: 2021-03-02 | End: 2021-03-02 | Stop reason: HOSPADM

## 2021-03-02 RX ORDER — DEXTROAMPHETAMINE SACCHARATE, AMPHETAMINE ASPARTATE, DEXTROAMPHETAMINE SULFATE AND AMPHETAMINE SULFATE 2.5; 2.5; 2.5; 2.5 MG/1; MG/1; MG/1; MG/1
30 TABLET ORAL DAILY
Status: CANCELLED | OUTPATIENT
Start: 2021-03-02

## 2021-03-02 RX ORDER — FLUTICASONE PROPIONATE 50 MCG
2 SPRAY, SUSPENSION (ML) NASAL AS NEEDED
Status: CANCELLED | OUTPATIENT
Start: 2021-03-02

## 2021-03-02 RX ORDER — PROPOFOL 10 MG/ML
INJECTION, EMULSION INTRAVENOUS AS NEEDED
Status: DISCONTINUED | OUTPATIENT
Start: 2021-03-02 | End: 2021-03-02 | Stop reason: HOSPADM

## 2021-03-02 RX ADMIN — PROPOFOL 50 MG: 10 INJECTION, EMULSION INTRAVENOUS at 09:20

## 2021-03-02 RX ADMIN — FENTANYL CITRATE 50 MCG: 50 INJECTION, SOLUTION INTRAMUSCULAR; INTRAVENOUS at 09:09

## 2021-03-02 RX ADMIN — ROCURONIUM BROMIDE 10 MG: 10 SOLUTION INTRAVENOUS at 09:09

## 2021-03-02 RX ADMIN — KETOROLAC TROMETHAMINE 30 MG: 30 INJECTION, SOLUTION INTRAMUSCULAR; INTRAVENOUS at 11:37

## 2021-03-02 RX ADMIN — MIDAZOLAM HYDROCHLORIDE 5 MG: 1 INJECTION, SOLUTION INTRAMUSCULAR; INTRAVENOUS at 08:59

## 2021-03-02 RX ADMIN — LIDOCAINE HYDROCHLORIDE 60 MG: 20 INJECTION, SOLUTION EPIDURAL; INFILTRATION; INTRACAUDAL; PERINEURAL at 09:09

## 2021-03-02 RX ADMIN — FENTANYL CITRATE 50 MCG: 50 INJECTION, SOLUTION INTRAMUSCULAR; INTRAVENOUS at 09:15

## 2021-03-02 RX ADMIN — HYDROMORPHONE HYDROCHLORIDE 0.2 MG: 1 INJECTION, SOLUTION INTRAMUSCULAR; INTRAVENOUS; SUBCUTANEOUS at 12:56

## 2021-03-02 RX ADMIN — FENTANYL CITRATE 25 MCG: 50 INJECTION, SOLUTION INTRAMUSCULAR; INTRAVENOUS at 11:54

## 2021-03-02 RX ADMIN — HYDROMORPHONE HYDROCHLORIDE 0.2 MG: 1 INJECTION, SOLUTION INTRAMUSCULAR; INTRAVENOUS; SUBCUTANEOUS at 13:24

## 2021-03-02 RX ADMIN — ACETAMINOPHEN 650 MG: 325 TABLET ORAL at 07:50

## 2021-03-02 RX ADMIN — DEXAMETHASONE SODIUM PHOSPHATE 8 MG: 4 INJECTION, SOLUTION INTRAMUSCULAR; INTRAVENOUS at 09:35

## 2021-03-02 RX ADMIN — FENTANYL CITRATE 25 MCG: 50 INJECTION, SOLUTION INTRAMUSCULAR; INTRAVENOUS at 11:46

## 2021-03-02 RX ADMIN — WATER 2 G: 1 INJECTION INTRAMUSCULAR; INTRAVENOUS; SUBCUTANEOUS at 09:20

## 2021-03-02 RX ADMIN — ROCURONIUM BROMIDE 20 MG: 10 SOLUTION INTRAVENOUS at 10:05

## 2021-03-02 RX ADMIN — ROCURONIUM BROMIDE 10 MG: 10 SOLUTION INTRAVENOUS at 10:45

## 2021-03-02 RX ADMIN — SUGAMMADEX 300 MG: 100 INJECTION, SOLUTION INTRAVENOUS at 11:20

## 2021-03-02 RX ADMIN — FENTANYL CITRATE 25 MCG: 50 INJECTION, SOLUTION INTRAMUSCULAR; INTRAVENOUS at 12:08

## 2021-03-02 RX ADMIN — ROCURONIUM BROMIDE 20 MG: 10 SOLUTION INTRAVENOUS at 09:41

## 2021-03-02 RX ADMIN — PROPOFOL 150 MG: 10 INJECTION, EMULSION INTRAVENOUS at 09:09

## 2021-03-02 RX ADMIN — HYDROMORPHONE HYDROCHLORIDE 0.2 MG: 1 INJECTION, SOLUTION INTRAMUSCULAR; INTRAVENOUS; SUBCUTANEOUS at 12:18

## 2021-03-02 RX ADMIN — HYDROMORPHONE HYDROCHLORIDE 0.5 MG: 2 INJECTION, SOLUTION INTRAMUSCULAR; INTRAVENOUS; SUBCUTANEOUS at 09:24

## 2021-03-02 RX ADMIN — ONDANSETRON 4 MG: 2 INJECTION INTRAMUSCULAR; INTRAVENOUS at 13:41

## 2021-03-02 RX ADMIN — HYDROMORPHONE HYDROCHLORIDE 0.2 MG: 1 INJECTION, SOLUTION INTRAMUSCULAR; INTRAVENOUS; SUBCUTANEOUS at 12:31

## 2021-03-02 RX ADMIN — OXYCODONE HYDROCHLORIDE AND ACETAMINOPHEN 1 TABLET: 5; 325 TABLET ORAL at 13:38

## 2021-03-02 RX ADMIN — HYDROMORPHONE HYDROCHLORIDE 0.2 MG: 1 INJECTION, SOLUTION INTRAMUSCULAR; INTRAVENOUS; SUBCUTANEOUS at 13:16

## 2021-03-02 RX ADMIN — FENTANYL CITRATE 25 MCG: 50 INJECTION, SOLUTION INTRAMUSCULAR; INTRAVENOUS at 12:13

## 2021-03-02 RX ADMIN — MEPERIDINE HYDROCHLORIDE 25 MG: 50 INJECTION INTRAMUSCULAR; INTRAVENOUS; SUBCUTANEOUS at 11:33

## 2021-03-02 RX ADMIN — ROCURONIUM BROMIDE 20 MG: 10 SOLUTION INTRAVENOUS at 09:17

## 2021-03-02 RX ADMIN — SODIUM CHLORIDE, POTASSIUM CHLORIDE, SODIUM LACTATE AND CALCIUM CHLORIDE 25 ML/HR: 600; 310; 30; 20 INJECTION, SOLUTION INTRAVENOUS at 07:50

## 2021-03-02 RX ADMIN — SUCCINYLCHOLINE CHLORIDE 140 MG: 20 INJECTION, SOLUTION INTRAMUSCULAR; INTRAVENOUS at 09:09

## 2021-03-02 RX ADMIN — ONDANSETRON HYDROCHLORIDE 4 MG: 2 INJECTION, SOLUTION INTRAMUSCULAR; INTRAVENOUS at 11:15

## 2021-03-02 RX ADMIN — SODIUM CHLORIDE, POTASSIUM CHLORIDE, SODIUM LACTATE AND CALCIUM CHLORIDE: 600; 310; 30; 20 INJECTION, SOLUTION INTRAVENOUS at 10:11

## 2021-03-02 RX ADMIN — ROCURONIUM BROMIDE 10 MG: 10 SOLUTION INTRAVENOUS at 10:39

## 2021-03-02 RX ADMIN — HYDROMORPHONE HYDROCHLORIDE 0.5 MG: 2 INJECTION, SOLUTION INTRAMUSCULAR; INTRAVENOUS; SUBCUTANEOUS at 11:20

## 2021-03-02 NOTE — DISCHARGE INSTRUCTIONS
[facility] Discharge Instructions    Hip Arthroscopy: What to Expect at Home  Your Recovery  Arthroscopy is a way to find problems and do surgery inside a joint without making a large cut (incision). Your doctor put a lighted tube with a tiny camera called an arthroscope, or scope and surgical tools through small incisions in your hip. You will feel tired for several days. Your hip will be swollen, and you may notice that your skin is a different color near the cuts (incisions). The swelling is normal and will start to go away in a few days. You will probably need about 6 weeks to recover. If your doctor repaired damaged tissue, recovery will take longer. You may have to limit your activity until your strength and movement return to normal. You may also be in a physical rehabilitation (rehab) program.  You may be able to return to a desk job or your normal routine in a few days. But if you do physical labor, it may be as long as 2 months before you can return to work. This care sheet gives you a general idea about how long it will take for you to recover. But each person recovers at a different pace. Follow the steps below to get better as quickly as possible. How can you care for yourself at home? Activity  · Rest when you feel tired. Getting enough sleep will help you recover. · Try to walk each day, after your doctor has said you can. Start by walking a little more than you did the day before. Bit by bit, increase the amount you walk. Walking boosts blood flow and helps prevent pneumonia and constipation. · You may have a brace or crutches or both. · Only put 50% of your weight on the operative leg. · If you have a desk job, you may be able to return to work a few days after the surgery. If you lift things or stand or walk a lot at work, it may be as long as 2 months before you can return.   · You can take a shower 72 hours after surgery and clean the incisions with regular soap and water. Do not take a bath or soak your knee until your doctor says it is okay. · Ask your doctor when you can drive again. · If you had a repair of torn tissue, follow your doctors instructions for lifting things or moving your hip. Diet  · You can eat your normal diet. If your stomach is upset, try bland, low-fat foods like plain rice, broiled chicken, toast, and yogurt. · Drink plenty of fluids, unless your doctor tells you not to. · You may notice that your bowel movements are not regular right after your surgery. This is common. Try to avoid constipation and straining with bowel movements. You may want to take a fiber supplement every day. If you have not had a bowel movement after a couple of days, ask your doctor about taking a mild laxative. Medicines  {Medication reconciliation information is now added to the patient's AVS automatically when it is printed. There is no need to use this SmartLink in discharge instructions. Highlight this text and delete it to clear this message}    · Take pain medicines exactly as directed. Take aspirin 81 mg once daily as prescribed to prevent blood clots. ¨ If the doctor gave you a prescription medicine for pain, take it as prescribed. ¨ Do not take two or more pain medicines at the same time unless the doctor told you to. Many pain medicines have acetaminophen, which is Tylenol. Too much acetaminophen (Tylenol) can be harmful. · If you think your pain medicine is making you sick to your stomach:  ¨ Take your medicine after meals (unless your doctor has told you not to). ¨ Ask your doctor for a different pain medicine. · If your doctor prescribed antibiotics, take them as directed. Do not stop taking them just because you feel better. You need to take the full course of antibiotics. Incision care  · If you have a dressing over your cuts (incisions) , keep it clean and dry. You may remove it 72 hours after the surgery.   · If your incisions are open to the air, keep the area clean and dry. · If you have strips of tape on the incisions, leave the tape on for a week or until it falls off. Exercise  · Move your toes and ankle as much as your bandages will allow. · Bend and straighten your knee slowly several times during the day. · Do rehab exercises as directed by your doctor or physical therapist. Physical therapy is a very important part of your healing. · Stop any activity that causes sharp pain. Talk to your doctor or physical therapist about what sports or other exercise you can do. Ice and elevation  · To reduce swelling and pain, put ice or a cold pack on your hip for 20 minutes at a time. Do this every 1 to 2 hours. Put a thin cloth between the ice and your skin. Follow-up care is a key part of your treatment and safety. Call the office to schedule your 2 week post op follow up visit. Your sutures will be removed at this time. Be sure to make and go to all appointments, and call your doctor if you are having problems. It's also a good idea to know your test results and keep a list of the medicines you take. When should you call for help? Call 911 anytime you think you may need emergency care. For example, call if:  · You pass out (lose consciousness). · You have severe trouble breathing. · You have sudden chest pain and shortness of breath, or you cough up blood. Call your doctor now or seek immediate medical care if:  · Your foot or toes are numb or tingling. · Your foot is cool or pale, or it changes color. · You are sick to your stomach or cannot keep fluids down. · You have pain that does not get better after you take pain medicine. · You have loose stitches, or your incision comes open. · Bright red blood has soaked through the bandage over your incision. · You have signs of infection, such as:  ¨ Increased pain, swelling, warmth, or redness. ¨ Red streaks leading from the incisions.   ¨ Pus draining from the incisions. ¨ Swollen lymph nodes in your neck, armpits, or groin. ¨ A fever. Watch closely for any changes in your health, and be sure to contact your doctor if:  You do not have a bowel movement after taking a laxative.

## 2021-03-02 NOTE — ADDENDUM NOTE
Addendum  created 03/02/21 1251 by Gregg Elizondo CRNA    Flowsheet accepted, Intraprocedure Flowsheets edited

## 2021-03-02 NOTE — ANESTHESIA PREPROCEDURE EVALUATION
Relevant Problems   No relevant active problems       Anesthetic History   No history of anesthetic complications            Review of Systems / Medical History  Patient summary reviewed, nursing notes reviewed and pertinent labs reviewed    Pulmonary          Smoker  Asthma        Neuro/Psych         Psychiatric history     Cardiovascular    Hypertension                   GI/Hepatic/Renal     GERD      PUD     Endo/Other        Arthritis     Other Findings              Physical Exam    Airway  Mallampati: II  TM Distance: > 6 cm  Neck ROM: normal range of motion   Mouth opening: Normal     Cardiovascular  Regular rate and rhythm,  S1 and S2 normal,  no murmur, click, rub, or gallop             Dental  No notable dental hx       Pulmonary  Breath sounds clear to auscultation               Abdominal  GI exam deferred       Other Findings            Anesthetic Plan    ASA: 2  Anesthesia type: general            Anesthetic plan and risks discussed with: Patient

## 2021-03-02 NOTE — ANESTHESIA POSTPROCEDURE EVALUATION
Post-Anesthesia Evaluation and Assessment    Patient: Levander Gowers MRN: [id number]  SSN: [id number]    YOB: 1980  Age: 36 y.o. Sex: female      I have evaluated the patient and they are stable and ready for discharge from the PACU. Cardiovascular Function/Vital Signs  Visit Vitals  /76   Pulse 65   Temp 36.4 °C (97.6 °F)   Resp 15   Ht 5' 10\" (1.778 m)   Wt 86.2 kg (190 lb)   SpO2 96%   BMI 27.26 kg/m²       Patient is status post General anesthesia for Procedure(s):  RIGHT HIP ARTHROSCOPY WITH DEBRIDEMENT; LABRAL REPAIR. Nausea/Vomiting: None    Postoperative hydration reviewed and adequate. Pain:  Pain Scale 1: Numeric (0 - 10) (03/02/21 1224)  Pain Intensity 1: 4 (03/02/21 1218)   Managed    Neurological Status:   Neuro (WDL): Within Defined Limits (03/02/21 1134)  Neuro  RLE Motor Response: Purposeful (03/02/21 1134)   At baseline    Mental Status, Level of Consciousness: Alert and  oriented to person, place, and time    Pulmonary Status:   O2 Device: Room air (03/02/21 1143)   Adequate oxygenation and airway patent    Complications related to anesthesia: None    Post-anesthesia assessment completed. No concerns    Signed By: Lynn Pretty MD     March 2, 2021              Procedure(s):  RIGHT HIP ARTHROSCOPY WITH DEBRIDEMENT; LABRAL REPAIR. general    <BSHSIANPOST>    INITIAL Post-op Vital signs:   Vitals Value Taken Time   /76 03/02/21 1230   Temp 36.4 °C (97.6 °F) 03/02/21 1143   Pulse 59 03/02/21 1241   Resp 16 03/02/21 1241   SpO2 96 % 03/02/21 1241   Vitals shown include unvalidated device data.

## 2021-03-02 NOTE — H&P
Date of Surgery Update:  Otto Fournier was seen and examined. History and physical has been reviewed. The patient has been examined.  There have been no significant clinical changes since the completion of the originally dated History and Physical.    Signed By: Estela Ng MD     March 2, 2021 7:29 AM

## 2021-03-02 NOTE — BRIEF OP NOTE
Brief Postoperative Note    Patient: Salma Colby  YOB: 1980  MRN: [id number]    Date of Procedure: 3/2/2021     Pre-Op Diagnosis: LOOSE BODY RIGHT HIP,  Labral tear, Cam impingement    Post-Op Diagnosis: Same as preoperative diagnosis. Procedure(s):  RIGHT HIP ARTHROSCOPY WITH LABRAL REPAIR,  Cam decompression. Surgeon(s):  Dorothea Sethi MD    Surgical Assistant: Surg Asst-1: Piedad Higuera    Anesthesia: General     Estimated Blood Loss (mL): Minimal    Complications: None    Specimens: * No specimens in log *     Implants:   Implant Name Type Inv. Item Serial No.  Lot No. LRB No. Used Action   ANCHOR SUT L15.5MM DIA2. 9MM HIP BIOCOMPOSITE KNOTLESS - SNA  ANCHOR SUT L15.5MM DIA2. 9MM HIP BIOCOMPOSITE KNOTLESS NA [id number] 2 Implanted       Drains: * No LDAs found *    Findings: Hip dysplasia and labral tear with cam lesion. Cam     Electronically Signed by Vidhya Gutierrez MD on 3/2/2021 at 11:34 AM

## 2021-03-16 NOTE — OP NOTES
[address]   OPERATIVE REPORT    Name:  Juwan Rodrigues  MR#:  [id number]  :  1980  ACCOUNT #:  [de-identified]  DATE OF SERVICE:  2021      PREOPERATIVE DIAGNOSES:  Right hip loose body labral tear, Cam impingement. POSTOPERATIVE DIAGNOSES:  Right hip loose body labral tear, Cam impingement. PROCEDURES PERFORMED:  Arthroscopy right hip labral repair, removal of loose body and Cam decompression on femoral neck. SURGEON:  Berto May MD    ASSISTANT:  Nella Hansen SA    ANESTHESIA:  General.    COMPLICATIONS:  None. SPECIMENS REMOVED:  None. IMPLANTS:  Arthrex PushLock suture anchors x2. ESTIMATED BLOOD LOSS:  Minimal.    PREOPERATIVE ANTIBIOTIC:  Ancef. COUNTS:  Correct. INDICATIONS:  This is a 44-year-old woman with MRI evidence of labral tear and hip impingement with loose body in her hip. She has failed nonoperative treatment options and presents for elective repair of labrum and Cam decompression versus excision of labral tear. PROCEDURE:  Anesthetic was initiated. Preoperative dose of antibiotic was given. The right side was confirmed as the operative side. Turned lateral, traction was applied, prepped and draped in the usual sterile fashion. Spinal needle was introduced into the hip joint and then the traction was reduced after the vacuum was released. Anterior and posterior peritrochanteric spinal needles were placed under fluoroscopic guidance. Posterior portal was established fluoroscopically, I established the anterior portal under direct visualization. Hip was examined. She had detached labral tear. Her labrum was in complete continuity, dissected above the acetabulum down to bone, used a bur to get the bone bleeding and then placed two suture anchors. I repaired the labrum back to a bleeding bed of bone at about the level of the anterior-inferior iliac spine. The undersurface fraying of the labrum was debrided.   The articular cartilage did have some grade I to II changes but no areas of exposed bone. A loose body was noticed and excised. The traction was slowly released and the extraarticular space was developed. After development of the extraarticular space, the Cam lesion was identified with the electrocautery and then a bur was used to remove it from posterior superior to anterior inferior recontouring the femoral head neck junction. The hip joint was then copiously irrigated and drained. Cannulas were removed. Portals were injected with local anesthetic and closed with staples. Sterile dressing was applied. There were no complications. No specimen was sent. Procedure was arthroscopy, labral repair, Cam decompression and removal of loose body. The patient tolerated the procedure well and was taken to the recovery room in stable condition.       Maria C Galvin MD [id number]  D:  03/16/2021 10:52  T:  03/16/2021 14:41  JOB #:  [id number]

## 2022-01-19 ENCOUNTER — OFFICE VISIT (OUTPATIENT)
Dept: ORTHOPEDIC SURGERY | Age: 42
End: 2022-01-19
Payer: COMMERCIAL

## 2022-01-19 VITALS — HEIGHT: 70 IN | WEIGHT: 200 LBS | BODY MASS INDEX: 28.63 KG/M2

## 2022-01-19 DIAGNOSIS — M22.42 CHONDROMALACIA OF LEFT PATELLA: ICD-10-CM

## 2022-01-19 DIAGNOSIS — M25.562 ACUTE PAIN OF LEFT KNEE: Primary | ICD-10-CM

## 2022-01-19 DIAGNOSIS — M16.11 PRIMARY LOCALIZED OSTEOARTHRITIS OF RIGHT HIP: ICD-10-CM

## 2022-01-19 DIAGNOSIS — M25.562 LEFT KNEE PAIN, UNSPECIFIED CHRONICITY: ICD-10-CM

## 2022-01-19 DIAGNOSIS — M23.204 DEGENERATIVE TEAR OF LEFT MEDIAL MENISCUS: ICD-10-CM

## 2022-01-19 DIAGNOSIS — Z98.890 S/P HIP ARTHROSCOPY: ICD-10-CM

## 2022-01-19 PROCEDURE — 99213 OFFICE O/P EST LOW 20 MIN: CPT | Performed by: ORTHOPAEDIC SURGERY

## 2022-01-19 NOTE — PROGRESS NOTES
Melissa Graham (: 1980) is a 39 y.o. female, patient, here for evaluation of the following chief complaint(s):  Hip Surgery (s/p right hip scope) and Knee Pain (left)       HPI:    Chief complaint is right hip pain. Secondary complaint is left knee pain. For her chief complaint her hip hurts her in her groin. Worse after she is had it bent for long period of time like driving in her work vehicle. She has had to do a lot of stair climbing and ladder climbing at work lately and this has exacerbated her hip pain. I think after discussion today it is probably still livable but if not the next step unfortunately is to replace her hip. Left knee pain mostly retropatellar. She feels a lot of crepitation. She does have meniscal symptoms as well with some mild catching locking in the medial compartment. Allergies   Allergen Reactions    Zithromax [Azithromycin] Hives       Current Outpatient Medications   Medication Sig    aspirin 81 mg chewable tablet Take 1 Tab by mouth daily. Indications: post op DVT prevention    ibuprofen (MOTRIN) 200 mg tablet Take 600 mg by mouth every eight (8) hours as needed for Pain.  fexofenadine-pseudoephedrine (Allegra-D 24 Hour) 180-240 mg per tablet Take 1 Tab by mouth as needed.  fluticasone propionate (Flonase Allergy Relief) 50 mcg/actuation nasal spray 2 Sprays by Both Nostrils route as needed.  losartan (COZAAR) 50 mg tablet Take 50 mg by mouth daily.  escitalopram oxalate (Lexapro) 10 mg tablet Take 20 mg by mouth nightly.  dextroamphetamine-amphetamine (AdderalL) 20 mg tablet Take 30 mg by mouth daily. No current facility-administered medications for this visit.        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    Psychiatric disorder     ANXIETY    PUD (peptic ulcer disease) 2008        Past Surgical History:   Procedure Laterality Date    HX GI  2008    COLONOSCOPY AND ENDOSCOPY    HX KNEE ARTHROSCOPY Right 2017    HX KNEE REPLACEMENT Right 06/2020    HX TUBAL LIGATION  2010       Family History   Problem Relation Age of Onset   James Arthritis-rheumatoid Mother     No Known Problems Father     No Known Problems Sister     No Known Problems Brother     Asthma Sister     No Known Problems Sister     No Known Problems Brother     No Known Problems Brother     Anesth Problems Neg Hx         Social History     Socioeconomic History    Marital status: SINGLE     Spouse name: Not on file    Number of children: Not on file    Years of education: Not on file    Highest education level: Not on file   Occupational History    Not on file   Tobacco Use    Smoking status: Light Tobacco Smoker     Years: 10.00    Smokeless tobacco: Never Used    Tobacco comment: MAYBE 2 PER WEEK   Vaping Use    Vaping Use: Not on file   Substance and Sexual Activity    Alcohol use: Yes     Comment: OCCASIONALLY    Drug use: Not Currently    Sexual activity: Not on file   Other Topics Concern    Not on file   Social History Narrative    Not on file     Social Determinants of Health     Financial Resource Strain:     Difficulty of Paying Living Expenses: Not on file   Food Insecurity:     Worried About Running Out of Food in the Last Year: Not on file    [place] of Food in the Last Year: Not on file   Transportation Needs:     Lack of Transportation (Medical): Not on file    Lack of Transportation (Non-Medical):  Not on file   Physical Activity:     Days of Exercise per Week: Not on file    Minutes of Exercise per Session: Not on file   Stress:     Feeling of Stress : Not on file   Social Connections:     Frequency of Communication with Friends and Family: Not on file    Frequency of Social Gatherings with Friends and Family: Not on file    Attends Mu-ism Services: Not on file    Active Member of Clubs or Organizations: Not on file    Attends Club or Organization Meetings: Not on file    Marital Status: Not on file   Intimate Partner Violence:     Fear of Current or Ex-Partner: Not on file    Emotionally Abused: Not on file    Physically Abused: Not on file    Sexually Abused: Not on file   Housing Stability:     Unable to Pay for Housing in the Last Year: Not on file    Number of [place] in the Last Year: Not on file    Unstable Housing in the Last Year: Not on file             Vitals:  Ht 5' 10\" (1.778 m)   Wt 200 lb (90.7 kg)   BMI 28.70 kg/m²    Body mass index is 28.7 kg/m². PHYSICAL EXAM:  Right hip has positive impingement test and positive logroll test.  Hip extends fully flexes to 100 degrees. Left knee exam shows crepitation of the patellofemoral joint. There is some medial joint space pain with a positive medial Jen's test.    IMAGING:  XR Results (maximum last 3): Results from Appointment encounter on 01/19/22    XR KNEE LT 3 V    Narrative  3 views left knee including standing AP both knees. Lateral left knee and merchant both knees. She does have medial joint space narrowing with some tibial subchondral sclerosis but no areas are bone-on-bone. XR HIP RT W OR WO PELV 2-3 VWS    Narrative  Right hip 2 views. Her joint space remains well-maintained though she does have a paralabral cyst.  She is status post femoral neck osteoplasty. AP pelvis and lateral of the right hip were obtained. Results from [place] encounter on 03/02/21    [place] XR TECHNOLOGIST SERVICE    Narrative  Fluoroscopy was utilized. Impression  FLUOROSCOPY WAS USED. Fluoro Dose:  14.32 mGy. vk      ASSESSMENT/PLAN:  1. Acute pain of left knee  -     XR KNEE LT 3 V; Future  2. S/P hip arthroscopy  -     XR HIP RT W OR WO PELV 2-3 VWS; Future  3.  Left knee pain, unspecified chronicity  4. Chondromalacia of left patella  5. Degenerative tear of left medial meniscus  6. Primary localized osteoarthritis of right hip    Patient and I discussed all of her issues in detail. I think she would probably benefit from a hyaluronic acid injection in her left knee. She is also going to make consult appointment with Dr. Clemens to see if she would be a candidate for arthroscopic debridement of her left knee. For her hip I would hope that we could delay hip replacement for some time. Follow-up with me on an as-needed basis. All questions were answered. An electronic signature was used to authenticate this note.   --Onesimo Petty MD

## 2022-02-07 DIAGNOSIS — M16.11 PRIMARY LOCALIZED OSTEOARTHRITIS OF RIGHT HIP: Primary | ICD-10-CM

## 2022-03-01 ENCOUNTER — HOSPITAL ENCOUNTER (OUTPATIENT)
Dept: PREADMISSION TESTING | Age: 42
Discharge: HOME | End: 2022-03-01
Payer: COMMERCIAL

## 2022-03-01 VITALS
RESPIRATION RATE: 20 BRPM | BODY MASS INDEX: 30.8 KG/M2 | HEIGHT: 70 IN | DIASTOLIC BLOOD PRESSURE: 88 MMHG | TEMPERATURE: 98 F | SYSTOLIC BLOOD PRESSURE: 144 MMHG | WEIGHT: 215.17 LBS | HEART RATE: 89 BPM

## 2022-03-01 LAB
ABO + RH BLD: NORMAL
ANION GAP SERPL CALC-SCNC: 6 MMOL/L (ref 5–15)
APPEARANCE UR: CLEAR
BACTERIA URNS QL MICRO: NEGATIVE /HPF
BILIRUB UR QL: NEGATIVE
BLOOD GROUP ANTIBODIES SERPL: NORMAL
BUN SERPL-MCNC: 9 MG/DL (ref 6–20)
BUN/CREAT SERPL: 8 (ref 12–20)
CALCIUM SERPL-MCNC: 9.4 MG/DL (ref 8.5–10.1)
CHLORIDE SERPL-SCNC: 104 MMOL/L (ref 97–108)
CO2 SERPL-SCNC: 25 MMOL/L (ref 21–32)
COLOR UR: NORMAL
CREAT SERPL-MCNC: 1.09 MG/DL (ref 0.55–1.02)
EPITH CASTS URNS QL MICRO: NORMAL /LPF
ERYTHROCYTE [DISTWIDTH] IN BLOOD BY AUTOMATED COUNT: 12.2 % (ref 11.5–14.5)
EST. AVERAGE GLUCOSE BLD GHB EST-MCNC: 100 MG/DL
GLUCOSE SERPL-MCNC: 126 MG/DL (ref 65–100)
GLUCOSE UR STRIP.AUTO-MCNC: NEGATIVE MG/DL
HBA1C MFR BLD: 5.1 % (ref 4–5.6)
HCG SERPL QL: NEGATIVE
HCT VFR BLD AUTO: 46 % (ref 35–47)
HGB BLD-MCNC: 15.8 G/DL (ref 11.5–16)
HGB UR QL STRIP: NEGATIVE
HYALINE CASTS URNS QL MICRO: NORMAL /LPF (ref 0–5)
INR PPP: 1 (ref 0.9–1.1)
KETONES UR QL STRIP.AUTO: NEGATIVE MG/DL
LEUKOCYTE ESTERASE UR QL STRIP.AUTO: NEGATIVE
MCH RBC QN AUTO: 33.9 PG (ref 26–34)
MCHC RBC AUTO-ENTMCNC: 34.3 G/DL (ref 30–36.5)
MCV RBC AUTO: 98.7 FL (ref 80–99)
NITRITE UR QL STRIP.AUTO: NEGATIVE
NRBC # BLD: 0 K/UL (ref 0–0.01)
NRBC BLD-RTO: 0 PER 100 WBC
PH UR STRIP: 6 [PH] (ref 5–8)
PLATELET # BLD AUTO: 416 K/UL (ref 150–400)
PMV BLD AUTO: 9.5 FL (ref 8.9–12.9)
POTASSIUM SERPL-SCNC: 3.7 MMOL/L (ref 3.5–5.1)
PROT UR STRIP-MCNC: NEGATIVE MG/DL
PROTHROMBIN TIME: 10 SEC (ref 9–11.1)
RBC # BLD AUTO: 4.66 M/UL (ref 3.8–5.2)
RBC #/AREA URNS HPF: NORMAL /HPF (ref 0–5)
SODIUM SERPL-SCNC: 135 MMOL/L (ref 136–145)
SP GR UR REFRACTOMETRY: 1.02 (ref 1–1.03)
SPECIMEN EXP DATE BLD: NORMAL
UA: UC IF INDICATED,UAUC: NORMAL
UROBILINOGEN UR QL STRIP.AUTO: 1 EU/DL (ref 0.2–1)
WBC # BLD AUTO: 10.8 K/UL (ref 3.6–11)
WBC URNS QL MICRO: NORMAL /HPF (ref 0–4)

## 2022-03-01 PROCEDURE — 86900 BLOOD TYPING SEROLOGIC ABO: CPT

## 2022-03-01 PROCEDURE — 85027 COMPLETE CBC AUTOMATED: CPT

## 2022-03-01 PROCEDURE — 36415 COLL VENOUS BLD VENIPUNCTURE: CPT

## 2022-03-01 PROCEDURE — 83036 HEMOGLOBIN GLYCOSYLATED A1C: CPT

## 2022-03-01 PROCEDURE — 80048 BASIC METABOLIC PNL TOTAL CA: CPT

## 2022-03-01 PROCEDURE — 85610 PROTHROMBIN TIME: CPT

## 2022-03-01 PROCEDURE — 84703 CHORIONIC GONADOTROPIN ASSAY: CPT

## 2022-03-01 PROCEDURE — 81001 URINALYSIS AUTO W/SCOPE: CPT

## 2022-03-01 RX ORDER — MONTELUKAST SODIUM 10 MG/1
10 TABLET ORAL DAILY
COMMUNITY

## 2022-03-01 RX ORDER — BUSPIRONE HYDROCHLORIDE 10 MG/1
10 TABLET ORAL
COMMUNITY

## 2022-03-01 RX ORDER — TRAZODONE HYDROCHLORIDE 150 MG/1
150 TABLET ORAL
COMMUNITY

## 2022-03-01 NOTE — PERIOP NOTES
[facility] INSTRUCTIONS  ORTHOPAEDIC    Surgery Date:   3-15-22    [facility] staff will call you between 4 PM- 8 PM the day before surgery with your arrival time. If your surgery is on a Monday, we will call you the preceding Friday. Please call [phone] after 8 PM if you did not receive your arrival time. 1. Please report to [address] Patient Access/Admitting on the 1st floor. Bring your insurance card, photo identification, and any copayment (if applicable). 2. If you are going home the same day of your surgery, you must have a responsible adult to drive you home. You need to have a responsible adult to stay with you the first 24 hours after surgery and you should not drive a car for 24 hours following your surgery. 3. Do NOT eat any solid foods after midnight the night before surgery including candy, mints or gum. You may drink clear liquids from midnight until 1 hour prior to arrival time. You may drink up to 12 ounces at one time every 4 hours. 4. Do NOT drink alcohol or smoke 24 hours before surgery. STOP smoking for 14 days prior as it helps with breathing and healing after surgery. 5. If your arrival time is 3pm or later, you may eat a light breakfast before 8am (toast, bagel-no butter, black coffee, plain tea, fruit juice-no pulp) Please note special instructions, if applicable, below for medications. 6. If you are being admitted to the hospital,please leave personal belongings/luggage in your car until you have an assigned hospital room number. 7. Please wear comfortable clothes. Wear your glasses instead of contacts. We ask that all money, jewelry and valuables be left at home. Wear no make up, particularly mascara, the day of surgery. 8.  All body piercings, rings, and jewelry need to be removed and left at home. Please remove any nail polish or artificial nails from your fingernails. Please wear your hair loose or down.  Please no pony-tails, buns, or any metal hair accessories. If you shower the morning of surgery, please do not apply any lotions or powders afterwards. You may wear deodorant. Do not shave any body area within 24 hours of your surgery. 9. Please follow all instructions to avoid any potential surgical cancellation. 10. Should your physical condition change, (i.e. fever, cold, flu, etc.) please notify your surgeon as soon as possible. 11. It is important to be on time. If a situation occurs where you may be delayed, please call:  [phone] / [phone] on the day of surgery. 12. The Preadmission Testing staff can be reached at [phone]. 13. Special instructions: NONE    Current Outpatient Medications   Medication Sig    busPIRone (BUSPAR) 10 mg tablet Take 10 mg by mouth nightly.  traZODone (DESYREL) 150 mg tablet Take 150 mg by mouth nightly.  montelukast (SINGULAIR) 10 mg tablet Take 10 mg by mouth daily.  ibuprofen (MOTRIN) 200 mg tablet Take 600 mg by mouth every eight (8) hours as needed for Pain.  fluticasone propionate (Flonase Allergy Relief) 50 mcg/actuation nasal spray 2 Sprays by Both Nostrils route as needed.  losartan (COZAAR) 50 mg tablet Take 50 mg by mouth daily.  escitalopram oxalate (Lexapro) 10 mg tablet Take 20 mg by mouth nightly.  dextroamphetamine-amphetamine (AdderalL) 20 mg tablet Take 30 mg by mouth daily. No current facility-administered medications for this encounter. 1. YOU MUST ONLY TAKE THESE MEDICATIONS THE MORNING OF SURGERY WITH A SIP OF WATER: SINGULAIR  2. MEDICATIONS TO TAKE THE MORNING OF SURGERY ONLY IF NEEDED: NONE  3. HOLD these prescription medications BEFORE Surgery: IBUPROFEN 3 DAYS PRIOR TO SURGERY  4. Ask your surgeon/prescribing physician about when/if to STOP taking these medications: NONE  5. Stop any non-steroidal anti-inflammatory drugs (i.e. Ibuprofen, Naproxen, Advil, Aleve) 3 days before surgery. You may take Tylenol.   STOP all vitamins and herbal supplements 1 week prior to  surgery. 6. If you are currently taking Plavix, Coumadin, or any other blood-thinning/anticoagulant medication contact your prescribing physician for instructions. Preventing Infections Before and After  Your Surgery    IMPORTANT INSTRUCTIONS    You play an important role in your health and preparation for surgery. To reduce the germs on your skin you will need to shower with CHG soap (Chorhexidine gluconate 4%) two times before surgery. CHG soap (Hibiclens, Hex-A-Clens or store brand)   CHG soap will be provided at your Preadmission Testing (PAT) appointment.  If you do not have a PAT appointment before surgery, you may arrange to  CHG soap from our office or purchase CHG soap at a pharmacy, grocery or department store.  You need to purchase TWO 4 ounce bottles to use for your 2 showers. Steps to follow:  1. Wash your hair with your normal shampoo and your body with regular soap and rinse well to remove shampoo and soap from your skin. 2. Wet a clean washcloth and turn off the shower. 3. Put CHG soap on washcloth and apply to your entire body from the neck down. Do not use on your head, face or private parts(genitals). Do not use CHG soap on open sores, wounds or areas of skin irritation. 4. Wash you body gently for 5 minutes. Do not wash your skin too hard. This soap does not create lather. Pay special attention to your underarms and from your belly button to your feet. 5. Turn the shower back on and rinse well to get CHG soap off your body. 6. Pat your skin dry with a clean, dry towel. Do not apply lotions or moisturizer. 7. Put on clean clothes and sleep on fresh bed sheets and do not allow pets to sleep with you. Shower with CHG soap 2 times before your surgery   The evening before your surgery   The morning of your surgery      Tips to help prevent infections after your surgery:  1.  Protect your surgical wound from germs:  ? Hand washing is the most important thing you and your caregivers can do to prevent infections. ? Keep your bandage clean and dry! ? Do not touch your surgical wound. 2. Use clean, freshly washed towels and washcloths every time you shower; do not share bath linens with others. 3. Until your surgical wound is healed, wear clothing and sleep on bed linens each day that are clean and freshly washed. 4. Do not allow pets to sleep in your bed with you or touch your surgical wound. 5. Do not smoke  smoking delays wound healing. This may be a good time to stop smoking. 6. If you have diabetes, it is important for you to manage your blood sugar levels properly before your surgery as well as after your surgery. Poorly managed blood sugar levels slow down wound healing and prevent you from healing completely. Prevention of Infection  Testing for Staphylococcus aureus on your skin before surgery    Staphylococcus aureus (staph) is a common bacteria that is found on the body. It normally does not cause infection on healthy skin. Before surgery, you will be tested to see if you have staph by swabbing the inside of your nose. When you have an incision with surgery, the goal is to protect that incision from infection. Removal of the staph bacteria before surgery can decrease the risk of a surgical site infection. If your nose swab is positive for staph you will be called. Your treatment will include 2 steps:   Prescription for Mupirocin ointment to be used in each nostril twice a day for 5 days.  Showering with Chlorhexidine (CHG) liquid soap for 5 days prior to surgery. How to use Mupirocin ointment in your nose  1.  the prescription from your pharmacy. You will receive a large tube of ointment which will be big enough for all of your treatments. You will apply this ointment to each nostril 2 times a day for 5 days. 2. Wash your hands with  gel or soap and water for 20 seconds before using ointment.   3. Place a pea-sized amount of ointment on a cotton Q-tip. 4. Apply ointment just inside of each nostril with the Q-tip. Do not push Q-tip or ointment deep inside you nose. 5. Press your nostrils together and massage for a few seconds. 6. Wash your hands with  gel or soap and water after you are finished. 7. Do not get ointment near your eyes. If it gets into your eyes, rinse them with cool water. 8. If you need to use nasal spray, clean the tip of the bottle with alcohol before use and do not use both at the same time. 9. If you are scheduled for COVID testing during the 5 days, do NOT apply morning dose until after the COVID test has been performed. How to use Chlorhexidine (CHG) 4% liquid soap  1. Purchase an 8 ounce bottle of CHG liquid soap (Chlorhexidine 4%, Hibiclens, Hex-A-Clens or store brand) at a pharmacy or grocery store. 2. Wash your hair with your normal shampoo and your body with regular soap and rinse well to remove shampoo and soap from your skin. 3. Wet a clean washcloth and turn off the shower. 4. Put CHG soap on washcloth and apply to your entire body from the neck down. Do not use on your head, face or private parts(genitals). Do not use CHG soap on open sores, wounds or areas of skin irritation. 5. Wash your body gently for 5 minutes. Do not wash your skin too hard. This soap does not create lather. Pay special attention to your underarms and from your belly button to your feet. 6. Turn the shower back on and rinse well to get CHG soap off your body. 7. Pat your skin dry with a clean, dry towel. Do not apply lotions or moisturizer. 8. Put on clean clothes and sleep on fresh bed sheets the night before surgery. Do not allow pets to sleep with you. Eating and Drinking Before Surgery     You may eat a regular dinner at the usual time on the day before your surgery.  Do NOT eat any solid foods after midnight unless your arrival time at the hospital is 3pm or later.    You may drink clear liquids only from 12 midnight until 1 hours prior to your arrival time at the hospital on the day of your surgery. Do NOT drink alcohol.  Clear liquids include:  o Water  o Fruit juices without pulp( i.e. apple juice)  o Carbonated beverages  o Black coffee (no cream/milk)  o Tea (no cream/milk)  o Gatorade   You may drink up to 12-16 ounces at one time every 4 hours between the hours of midnight and 1 hour before your arrival time at the hospital. Example- if your arrival time at the hospital is 6am, you may drink 12-16 ounces of clear liquids no later than 5am.   If your arrival time at the hospital is 3pm or later, you may eat a light breakfast before 8am.   A light breakfast includes:  o Toast or bagel (no butter)  o Black coffee (no cream/milk)  o Tea (no cream/milk)  o Fruit juices without pulp ( i.e. apple juice)  o Do NOT eat meat, eggs, vegetables or fruit   If you have any questions, please contact your surgeon's office. [address]   Instructions for Pre-Surgery COVID-19 Testing     Across our ministry we have established standard guidelines to ensure the health and safety of our patients, residents and associates as we resume elective services for patients. All patients presenting for surgery are required to have a COVID-19 test result within 96 hours of their scheduled surgery. [facility] C[facility] is providing this test free of charge to the patient.    Instructions for COVID-19 Testing:     Patients will receive a call from Pre-Admission Testing 4-5 days prior to surgery to schedule a date and time to come to the [address] [place] [address] Drive for their COVID-19 test   Patients are advised to self-quarantine after testing until their scheduled surgery   Once on site, patients will be registered and receive COVID test in their vehicle   If a patient is scheduled for normal Pre Admission Testing 96 hours from date of surgery, the patient will still have their COVID test done at the [address] [place] [address] located at [address] [place] [address] Positive results will be shared with the surgeon and anesthesiologist and may result in cancellation of the elective procedure    Testing Hours and Location:   Address:  [address] Admission [facility] in the Discharge Lot on GERI (Map Attached)  [address], [address]   Hours: Monday- Friday 7a-3p    PAT Phone Number: [phone]            Patient Information Regarding COVID Restrictions    Patients are advised to self-quarantine after COVID testing up to the day of the scheduled procedure. Day of Procedure     Please park in the parking deck or any designated visitor parking lot.  Enter the facility through the Main Entrance of the hospital.   A temperature check and appropriate symptom/exposure screening will be done prior to entry to the facility.  On the day of surgery, please provide the cell phone number of the person who will be waiting for you to the Patient Access representative at the time of registration.  Please wear a mask on the day of your procedure.  We are now allowing one designated visitor per stay. Pediatric patients may have 2 designated visitors. This one person may come in with you on the day of your procedure.  No visitors under the age of 13.  The designated visitor must also wear a mask.  Once your procedure and the immediate recovery period is completed, a nurse in the recovery area will contact your designated visitor to inform them of your room number or to otherwise review other pertinent information regarding your care.  Social distancing practices are to be adhered to in waiting areas and the cafeteria. The patient was contacted in person. She verbalized understanding of all instructions does not  need reinforcement.

## 2022-03-02 ENCOUNTER — DOCUMENTATION ONLY (OUTPATIENT)
Dept: ORTHOPEDIC SURGERY | Age: 42
End: 2022-03-02

## 2022-03-02 LAB
BACTERIA SPEC CULT: NORMAL
BACTERIA SPEC CULT: NORMAL
SERVICE CMNT-IMP: NORMAL

## 2022-03-02 NOTE — PERIOP NOTES
PT NEEDS EKG, VM LEFT FOR PT TO CALL PAT. PT RETURNED CALL TO [facility]; SHE WILL BE IN [place] ON 3/10/22 FOR HER COVID TEST AND WILL THEN COME PT PAT FOR HER EKG.

## 2022-03-02 NOTE — PERIOP NOTES
PAT Nurse Practitioner   Pre-Operative Chart Review/Assessment:-ORTHOPEDIC                Patient Name:  Alejandro Souza                                                           Age:   39 y.o.    :  1980     Today's Date:  3/3/2022     Date of PAT:   3/1/2022      Date of Surgery:    3/15/2022      Procedure(s):  Right Total Hip Arthroplasty     Surgeon:   Dr. Tay Hartley                       PLAN:      1)  Medical Clearance:  Dr. Enriqueta Sweeney      2)  Cardiac Clearance:  EKG and METs reviewed. No further cardiac evaluation requested. PAT EKG: normal EKG, normal sinus rhythm. 3)  Diabetic Treatment Consult:  Not indicated. A1c-5.1      4)  Sleep Apnea evaluation:   Not indicated. PENELOPE Score 2.       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     traZODone (DESYREL) 150 mg tablet Take 150 mg by mouth nightly.  montelukast (SINGULAIR) 10 mg tablet Take 10 mg by mouth daily.  ibuprofen (MOTRIN) 200 mg tablet Take 600 mg by mouth every eight (8) hours as needed for Pain.  fluticasone propionate (Flonase Allergy Relief) 50 mcg/actuation nasal spray 2 Sprays by Both Nostrils route as needed.  losartan (COZAAR) 50 mg tablet Take 50 mg by mouth daily.  escitalopram oxalate (Lexapro) 10 mg tablet Take 20 mg by mouth nightly.  dextroamphetamine-amphetamine (AdderalL) 20 mg tablet Take 30 mg by mouth daily.      No current facility-administered medications for this encounter.      ____________________________________________  ALLERGIES  Allergies   Allergen Reactions    Zithromax [Azithromycin] Hives      ____________________________________________  SOCIAL HISTORY  Social History     Tobacco Use    Smoking status: Light Tobacco Smoker     Years: 10.00    Smokeless tobacco: Never Used    Tobacco comment: SMOKES 10 CIGARRETTES A WEEK   Substance Use Topics    Alcohol use: Yes     Comment: OCCASIONALLY      ____________________________________________   Internal Administration   First Dose      Second Dose         Last COVID Lab SARS-CoV-2 ( )   Date Value   03/10/2022 Not detected        Labs:     Hospital Outpatient Visit on 03/01/2022   Component Date Value Ref Range Status    Sodium 03/01/2022 135* 136 - 145 mmol/L Final    Potassium 03/01/2022 3.7  3.5 - 5.1 mmol/L Final    Chloride 03/01/2022 104  97 - 108 mmol/L Final    CO2 03/01/2022 25  21 - 32 mmol/L Final    Anion gap 03/01/2022 6  5 - 15 mmol/L Final    Glucose 03/01/2022 126* 65 - 100 mg/dL Final    BUN 03/01/2022 9  6 - 20 MG/DL Final    Creatinine 03/01/2022 1.09* 0.55 - 1.02 MG/DL Final    BUN/Creatinine ratio 03/01/2022 8* 12 - 20   Final    GFR est AA 03/01/2022 >60  >60 ml/min/1.73m2 Final    GFR est non-AA 03/01/2022 55* >60 ml/min/1.73m2 Final    Estimated GFR is calculated using the IDMS-traceable Modification of Diet in Renal Disease (MDRD) Study equation, reported for both  Americans (GFRAA) and non- Americans (GFRNA), and normalized to 1.73m2 body surface area. The physician must decide which value applies to the patient.  Calcium 03/01/2022 9.4  8.5 - 10.1 MG/DL Final    WBC 03/01/2022 10.8  3.6 - 11.0 K/uL Final    RBC 03/01/2022 4.66  3.80 - 5.20 M/uL Final    HGB 03/01/2022 15.8  11.5 - 16.0 g/dL Final    HCT 03/01/2022 46.0  35.0 - 47.0 % Final    MCV 03/01/2022 98.7  80.0 - 99.0 FL Final    MCH 03/01/2022 33.9  26.0 - 34.0 PG Final    MCHC 03/01/2022 34.3  30.0 - 36.5 g/dL Final    RDW 03/01/2022 12.2  11.5 - 14.5 % Final    PLATELET 46/87/8249 405* 150 - 400 K/uL Final    MPV 03/01/2022 9.5  8.9 - 12.9 FL Final    NRBC 03/01/2022 0.0  0  WBC Final    ABSOLUTE NRBC 03/01/2022 0.00  0.00 - 0.01 K/uL Final    Crossmatch Expiration 03/01/2022 03/15/2022,2359   Final    ABO/Rh(D) 03/01/2022 O POSITIVE   Final    Antibody screen 03/01/2022 NEG   Final    INR 03/01/2022 1.0  0.9 - 1.1   Final    A single therapeutic range for Vit K antagonists may not be optimal for all indications - see June, 2008 issue of Chest, American College of Chest Physicians Evidence-Based Clinical Practice Guidelines, 8th Edition.     Prothrombin time 03/01/2022 10.0  9.0 - 11.1 sec Final    Color 03/01/2022 YELLOW/STRAW    Final    Color Reference Range: Straw, Yellow or Dark Yellow    Appearance 03/01/2022 CLEAR  CLEAR   Final    Specific gravity 03/01/2022 1.023  1.003 - 1.030   Final    pH (UA) 03/01/2022 6.0  5.0 - 8.0   Final    Protein 03/01/2022 Negative  NEG mg/dL Final    Glucose 03/01/2022 Negative  NEG mg/dL Final    Ketone 03/01/2022 Negative  NEG mg/dL Final    Bilirubin 03/01/2022 Negative  NEG   Final    Blood 03/01/2022 Negative  NEG   Final    Urobilinogen 03/01/2022 1.0  0.2 - 1.0 EU/dL Final    Nitrites 03/01/2022 Negative  NEG   Final    Leukocyte Esterase 03/01/2022 Negative  NEG   Final    UA:UC IF INDICATED 03/01/2022 CULTURE NOT INDICATED BY UA RESULT  CNI   Final    WBC 03/01/2022 0-4  0 - 4 /hpf Final    RBC 03/01/2022 0-5  0 - 5 /hpf Final    Epithelial cells 03/01/2022 FEW  FEW /lpf Final    Epithelial cell category consists of squamous cells and /or transitional urothelial cells. Renal tubular cells, if present, are separately identified as such.  Bacteria 03/01/2022 Negative  NEG /hpf Final    Hyaline cast 03/01/2022 0-2  0 - 5 /lpf Final    Hemoglobin A1c 03/01/2022 5.1  4.0 - 5.6 % Final    Comment: NEW METHOD  PLEASE NOTE NEW REFERENCE RANGE  (NOTE)  HbA1C Interpretive Ranges  <5.7              Normal  5.7 - 6.4         Consider Prediabetes  >6.5              Consider Diabetes      Est. average glucose 03/01/2022 100  mg/dL Final    Special Requests: 03/01/2022 NO SPECIAL REQUESTS    Final    Culture result: 03/01/2022 MRSA NOT PRESENT    Final            HCG, Ql. 03/01/2022 Negative  NEG   Final    The serum pregnancy test becomes positive at about the time of implantation of the conceptus and approximates a quantitative bHCG value of >5 mIU/ML. Skin:     Denies open wounds, cuts, sores, rashes or other areas of concern in PAT assessment.           Mayi García NP

## 2022-03-03 PROBLEM — M16.11 PRIMARY OSTEOARTHRITIS OF RIGHT HIP: Status: ACTIVE | Noted: 2022-03-03

## 2022-03-10 ENCOUNTER — TRANSCRIBE ORDER (OUTPATIENT)
Dept: REGISTRATION | Age: 42
End: 2022-03-10

## 2022-03-10 ENCOUNTER — HOSPITAL ENCOUNTER (OUTPATIENT)
Dept: PREADMISSION TESTING | Age: 42
Discharge: HOME | End: 2022-03-10
Payer: COMMERCIAL

## 2022-03-10 ENCOUNTER — HOSPITAL ENCOUNTER (OUTPATIENT)
Dept: PREADMISSION TESTING | Age: 42
Discharge: HOME | End: 2022-03-10
Attending: ORTHOPAEDIC SURGERY
Payer: COMMERCIAL

## 2022-03-10 DIAGNOSIS — U07.1 COVID-19: Primary | ICD-10-CM

## 2022-03-10 DIAGNOSIS — U07.1 COVID-19: ICD-10-CM

## 2022-03-10 PROCEDURE — U0005 INFEC AGEN DETEC AMPLI PROBE: HCPCS

## 2022-03-10 PROCEDURE — 93005 ELECTROCARDIOGRAM TRACING: CPT

## 2022-03-11 LAB
ATRIAL RATE: 75 BPM
CALCULATED P AXIS, ECG09: 38 DEGREES
CALCULATED R AXIS, ECG10: 22 DEGREES
CALCULATED T AXIS, ECG11: 19 DEGREES
DIAGNOSIS, 93000: NORMAL
P-R INTERVAL, ECG05: 154 MS
Q-T INTERVAL, ECG07: 376 MS
QRS DURATION, ECG06: 96 MS
QTC CALCULATION (BEZET), ECG08: 419 MS
SARS-COV-2, XPLCVT: NOT DETECTED
SOURCE, COVRS: NORMAL
VENTRICULAR RATE, ECG03: 75 BPM

## 2022-03-14 ENCOUNTER — ANESTHESIA EVENT (OUTPATIENT)
Dept: SURGERY | Age: 42
End: 2022-03-14
Payer: COMMERCIAL

## 2022-03-15 ENCOUNTER — APPOINTMENT (OUTPATIENT)
Dept: GENERAL RADIOLOGY | Age: 42
End: 2022-03-15
Attending: ORTHOPAEDIC SURGERY
Payer: COMMERCIAL

## 2022-03-15 ENCOUNTER — HOSPITAL ENCOUNTER (OUTPATIENT)
Age: 42
Discharge: HOME | End: 2022-03-15
Attending: ORTHOPAEDIC SURGERY | Admitting: ORTHOPAEDIC SURGERY
Payer: COMMERCIAL

## 2022-03-15 ENCOUNTER — ANESTHESIA (OUTPATIENT)
Dept: SURGERY | Age: 42
End: 2022-03-15
Payer: COMMERCIAL

## 2022-03-15 VITALS
BODY MASS INDEX: 30.8 KG/M2 | SYSTOLIC BLOOD PRESSURE: 119 MMHG | RESPIRATION RATE: 17 BRPM | DIASTOLIC BLOOD PRESSURE: 72 MMHG | TEMPERATURE: 97.7 F | HEIGHT: 70 IN | WEIGHT: 215.17 LBS | OXYGEN SATURATION: 95 % | HEART RATE: 89 BPM

## 2022-03-15 DIAGNOSIS — Z96.641 S/P HIP REPLACEMENT, RIGHT: Primary | ICD-10-CM

## 2022-03-15 DIAGNOSIS — Z96.641 STATUS POST TOTAL REPLACEMENT OF RIGHT HIP: Primary | ICD-10-CM

## 2022-03-15 DIAGNOSIS — M16.11 PRIMARY OSTEOARTHRITIS OF RIGHT HIP: ICD-10-CM

## 2022-03-15 PROBLEM — Z98.890 STATUS POST SURGERY: Status: ACTIVE | Noted: 2022-03-15

## 2022-03-15 LAB — HCG UR QL: NEGATIVE

## 2022-03-15 PROCEDURE — 77030031139 HC SUT VCRL2 J&J -A: Performed by: ORTHOPAEDIC SURGERY

## 2022-03-15 PROCEDURE — 97530 THERAPEUTIC ACTIVITIES: CPT

## 2022-03-15 PROCEDURE — 77030006783 HC BLD SAW OSC MCRA -A: Performed by: ORTHOPAEDIC SURGERY

## 2022-03-15 PROCEDURE — 76210000017 HC OR PH I REC 1.5 TO 2 HR: Performed by: ORTHOPAEDIC SURGERY

## 2022-03-15 PROCEDURE — 74011250636 HC RX REV CODE- 250/636: Performed by: PHYSICIAN ASSISTANT

## 2022-03-15 PROCEDURE — 97116 GAIT TRAINING THERAPY: CPT

## 2022-03-15 PROCEDURE — 77030034479 HC ADH SKN CLSR PRINEO J&J -B: Performed by: ORTHOPAEDIC SURGERY

## 2022-03-15 PROCEDURE — 76060000036 HC ANESTHESIA 2.5 TO 3 HR: Performed by: ORTHOPAEDIC SURGERY

## 2022-03-15 PROCEDURE — 77030026438 HC STYL ET INTUB CARD -A: Performed by: ANESTHESIOLOGY

## 2022-03-15 PROCEDURE — 77030006822 HC BLD SAW SAG BRSM -B: Performed by: ORTHOPAEDIC SURGERY

## 2022-03-15 PROCEDURE — 74011250636 HC RX REV CODE- 250/636: Performed by: ORTHOPAEDIC SURGERY

## 2022-03-15 PROCEDURE — 74011000250 HC RX REV CODE- 250: Performed by: ORTHOPAEDIC SURGERY

## 2022-03-15 PROCEDURE — 77030008684 HC TU ET CUF COVD -B: Performed by: ANESTHESIOLOGY

## 2022-03-15 PROCEDURE — 97165 OT EVAL LOW COMPLEX 30 MIN: CPT

## 2022-03-15 PROCEDURE — 74011000250 HC RX REV CODE- 250: Performed by: PHYSICIAN ASSISTANT

## 2022-03-15 PROCEDURE — 2709999900 HC NON-CHARGEABLE SUPPLY: Performed by: ORTHOPAEDIC SURGERY

## 2022-03-15 PROCEDURE — 81025 URINE PREGNANCY TEST: CPT

## 2022-03-15 PROCEDURE — 74011250637 HC RX REV CODE- 250/637: Performed by: PHYSICIAN ASSISTANT

## 2022-03-15 PROCEDURE — 74011250636 HC RX REV CODE- 250/636: Performed by: NURSE ANESTHETIST, CERTIFIED REGISTERED

## 2022-03-15 PROCEDURE — 74011250636 HC RX REV CODE- 250/636

## 2022-03-15 PROCEDURE — 74011000250 HC RX REV CODE- 250

## 2022-03-15 PROCEDURE — 97535 SELF CARE MNGMENT TRAINING: CPT

## 2022-03-15 PROCEDURE — 27132 TOTAL HIP ARTHROPLASTY: CPT | Performed by: ORTHOPAEDIC SURGERY

## 2022-03-15 PROCEDURE — C1776 JOINT DEVICE (IMPLANTABLE): HCPCS | Performed by: ORTHOPAEDIC SURGERY

## 2022-03-15 PROCEDURE — C9290 INJ, BUPIVACAINE LIPOSOME: HCPCS | Performed by: ORTHOPAEDIC SURGERY

## 2022-03-15 PROCEDURE — 74011000258 HC RX REV CODE- 258: Performed by: ORTHOPAEDIC SURGERY

## 2022-03-15 PROCEDURE — 77030040922 HC BLNKT HYPOTHRM STRY -A

## 2022-03-15 PROCEDURE — 74011250636 HC RX REV CODE- 250/636: Performed by: ANESTHESIOLOGY

## 2022-03-15 PROCEDURE — 74011000250 HC RX REV CODE- 250: Performed by: NURSE ANESTHETIST, CERTIFIED REGISTERED

## 2022-03-15 PROCEDURE — 77030003813: Performed by: ORTHOPAEDIC SURGERY

## 2022-03-15 PROCEDURE — 77030008462 HC STPLR SKN PROX J&J -A: Performed by: ORTHOPAEDIC SURGERY

## 2022-03-15 PROCEDURE — G0378 HOSPITAL OBSERVATION PER HR: HCPCS

## 2022-03-15 PROCEDURE — 73501 X-RAY EXAM HIP UNI 1 VIEW: CPT

## 2022-03-15 PROCEDURE — 76010000172 HC OR TIME 2.5 TO 3 HR INTENSV-TIER 1: Performed by: ORTHOPAEDIC SURGERY

## 2022-03-15 PROCEDURE — 97161 PT EVAL LOW COMPLEX 20 MIN: CPT

## 2022-03-15 DEVICE — BIOLOX DELTA CERAMIC FEMORAL HEAD +5.0 36MM DIA 12/14 TAPER
Type: IMPLANTABLE DEVICE | Site: HIP | Status: FUNCTIONAL
Brand: BIOLOX DELTA

## 2022-03-15 DEVICE — APEX HOLE ELIMINATOR - PS
Type: IMPLANTABLE DEVICE | Site: HIP | Status: FUNCTIONAL
Brand: APEX

## 2022-03-15 DEVICE — ACTIS DUOFIX HIP PROSTHESIS (FEMORAL STEM 12/14 TAPER CEMENTLESS SIZE 6 STD COLLAR)  CE
Type: IMPLANTABLE DEVICE | Site: HIP | Status: FUNCTIONAL
Brand: ACTIS

## 2022-03-15 DEVICE — HIP H2 TOT ADV OTHER HD IMPL CAPPED SYNTHES: Type: IMPLANTABLE DEVICE | Status: FUNCTIONAL

## 2022-03-15 DEVICE — PINNACLE GRIPTION ACETABULAR SHELL SECTOR 52MM OD
Type: IMPLANTABLE DEVICE | Site: HIP | Status: FUNCTIONAL
Brand: PINNACLE GRIPTION

## 2022-03-15 DEVICE — PINNACLE CANCELLOUS BONE SCREW 6.5MM X 25MM
Type: IMPLANTABLE DEVICE | Site: HIP | Status: FUNCTIONAL
Brand: PINNACLE

## 2022-03-15 DEVICE — PINNACLE HIP SOLUTIONS ALTRX POLYETHYLENE ACETABULAR LINER NEUTRAL 36MM ID 52MM OD
Type: IMPLANTABLE DEVICE | Site: HIP | Status: FUNCTIONAL
Brand: PINNACLE ALTRX

## 2022-03-15 DEVICE — PINNACLE CANCELLOUS BONE SCREW 6.5MM X 35MM
Type: IMPLANTABLE DEVICE | Site: HIP | Status: FUNCTIONAL
Brand: PINNACLE

## 2022-03-15 RX ORDER — SODIUM CHLORIDE 0.9 % (FLUSH) 0.9 %
5-40 SYRINGE (ML) INJECTION AS NEEDED
Status: DISCONTINUED | OUTPATIENT
Start: 2022-03-15 | End: 2022-03-15 | Stop reason: HOSPADM

## 2022-03-15 RX ORDER — FENTANYL CITRATE 50 UG/ML
50 INJECTION, SOLUTION INTRAMUSCULAR; INTRAVENOUS AS NEEDED
Status: DISCONTINUED | OUTPATIENT
Start: 2022-03-15 | End: 2022-03-15 | Stop reason: HOSPADM

## 2022-03-15 RX ORDER — SODIUM CHLORIDE 0.9 % (FLUSH) 0.9 %
5-40 SYRINGE (ML) INJECTION EVERY 8 HOURS
Status: DISCONTINUED | OUTPATIENT
Start: 2022-03-15 | End: 2022-03-15 | Stop reason: HOSPADM

## 2022-03-15 RX ORDER — ASPIRIN 325 MG
325 TABLET, DELAYED RELEASE (ENTERIC COATED) ORAL 2 TIMES DAILY
Qty: 60 TABLET | Refills: 0 | Status: SHIPPED | OUTPATIENT
Start: 2022-03-15 | End: 2022-04-14

## 2022-03-15 RX ORDER — PROPOFOL 10 MG/ML
INJECTION, EMULSION INTRAVENOUS AS NEEDED
Status: DISCONTINUED | OUTPATIENT
Start: 2022-03-15 | End: 2022-03-15 | Stop reason: HOSPADM

## 2022-03-15 RX ORDER — ONDANSETRON 2 MG/ML
4 INJECTION INTRAMUSCULAR; INTRAVENOUS AS NEEDED
Status: DISCONTINUED | OUTPATIENT
Start: 2022-03-15 | End: 2022-03-15 | Stop reason: HOSPADM

## 2022-03-15 RX ORDER — AMOXICILLIN 250 MG
1 CAPSULE ORAL 2 TIMES DAILY
Status: DISCONTINUED | OUTPATIENT
Start: 2022-03-15 | End: 2022-03-15 | Stop reason: HOSPADM

## 2022-03-15 RX ORDER — GLYCOPYRROLATE 0.2 MG/ML
INJECTION INTRAMUSCULAR; INTRAVENOUS AS NEEDED
Status: DISCONTINUED | OUTPATIENT
Start: 2022-03-15 | End: 2022-03-15 | Stop reason: HOSPADM

## 2022-03-15 RX ORDER — ONDANSETRON 2 MG/ML
4 INJECTION INTRAMUSCULAR; INTRAVENOUS
Status: DISCONTINUED | OUTPATIENT
Start: 2022-03-15 | End: 2022-03-15 | Stop reason: HOSPADM

## 2022-03-15 RX ORDER — ACETAMINOPHEN 500 MG
1000 TABLET ORAL EVERY 6 HOURS
Status: DISCONTINUED | OUTPATIENT
Start: 2022-03-15 | End: 2022-03-15 | Stop reason: HOSPADM

## 2022-03-15 RX ORDER — MONTELUKAST SODIUM 10 MG/1
10 TABLET ORAL DAILY
Status: DISCONTINUED | OUTPATIENT
Start: 2022-03-16 | End: 2022-03-15 | Stop reason: HOSPADM

## 2022-03-15 RX ORDER — HYDROMORPHONE HYDROCHLORIDE 1 MG/ML
0.2 INJECTION, SOLUTION INTRAMUSCULAR; INTRAVENOUS; SUBCUTANEOUS
Status: DISCONTINUED | OUTPATIENT
Start: 2022-03-15 | End: 2022-03-15 | Stop reason: HOSPADM

## 2022-03-15 RX ORDER — BUSPIRONE HYDROCHLORIDE 10 MG/1
10 TABLET ORAL
Status: DISCONTINUED | OUTPATIENT
Start: 2022-03-15 | End: 2022-03-15 | Stop reason: HOSPADM

## 2022-03-15 RX ORDER — POLYETHYLENE GLYCOL 3350 17 G/17G
17 POWDER, FOR SOLUTION ORAL DAILY
Qty: 14 PACKET | Refills: 0 | Status: SHIPPED | OUTPATIENT
Start: 2022-03-16 | End: 2022-03-30

## 2022-03-15 RX ORDER — FENTANYL CITRATE 50 UG/ML
INJECTION, SOLUTION INTRAMUSCULAR; INTRAVENOUS AS NEEDED
Status: DISCONTINUED | OUTPATIENT
Start: 2022-03-15 | End: 2022-03-15 | Stop reason: HOSPADM

## 2022-03-15 RX ORDER — LIDOCAINE HYDROCHLORIDE 10 MG/ML
0.1 INJECTION, SOLUTION EPIDURAL; INFILTRATION; INTRACAUDAL; PERINEURAL AS NEEDED
Status: DISCONTINUED | OUTPATIENT
Start: 2022-03-15 | End: 2022-03-15 | Stop reason: HOSPADM

## 2022-03-15 RX ORDER — ACETAMINOPHEN 325 MG/1
650 TABLET ORAL ONCE
Status: DISCONTINUED | OUTPATIENT
Start: 2022-03-15 | End: 2022-03-15 | Stop reason: HOSPADM

## 2022-03-15 RX ORDER — LIDOCAINE HYDROCHLORIDE 20 MG/ML
INJECTION, SOLUTION EPIDURAL; INFILTRATION; INTRACAUDAL; PERINEURAL AS NEEDED
Status: DISCONTINUED | OUTPATIENT
Start: 2022-03-15 | End: 2022-03-15 | Stop reason: HOSPADM

## 2022-03-15 RX ORDER — MIDAZOLAM HYDROCHLORIDE 1 MG/ML
INJECTION, SOLUTION INTRAMUSCULAR; INTRAVENOUS AS NEEDED
Status: DISCONTINUED | OUTPATIENT
Start: 2022-03-15 | End: 2022-03-15 | Stop reason: HOSPADM

## 2022-03-15 RX ORDER — ROCURONIUM BROMIDE 10 MG/ML
INJECTION, SOLUTION INTRAVENOUS AS NEEDED
Status: DISCONTINUED | OUTPATIENT
Start: 2022-03-15 | End: 2022-03-15 | Stop reason: HOSPADM

## 2022-03-15 RX ORDER — HYDRALAZINE HYDROCHLORIDE 20 MG/ML
INJECTION INTRAMUSCULAR; INTRAVENOUS AS NEEDED
Status: DISCONTINUED | OUTPATIENT
Start: 2022-03-15 | End: 2022-03-15 | Stop reason: HOSPADM

## 2022-03-15 RX ORDER — SODIUM CHLORIDE, SODIUM LACTATE, POTASSIUM CHLORIDE, CALCIUM CHLORIDE 600; 310; 30; 20 MG/100ML; MG/100ML; MG/100ML; MG/100ML
INJECTION, SOLUTION INTRAVENOUS
Status: DISCONTINUED | OUTPATIENT
Start: 2022-03-15 | End: 2022-03-15 | Stop reason: HOSPADM

## 2022-03-15 RX ORDER — MIDAZOLAM HYDROCHLORIDE 1 MG/ML
1 INJECTION, SOLUTION INTRAMUSCULAR; INTRAVENOUS AS NEEDED
Status: DISCONTINUED | OUTPATIENT
Start: 2022-03-15 | End: 2022-03-15 | Stop reason: HOSPADM

## 2022-03-15 RX ORDER — ASPIRIN 325 MG
325 TABLET, DELAYED RELEASE (ENTERIC COATED) ORAL 2 TIMES DAILY
Qty: 60 TABLET | Refills: 0 | Status: SHIPPED | OUTPATIENT
Start: 2022-03-15 | End: 2022-03-15

## 2022-03-15 RX ORDER — FENTANYL CITRATE 50 UG/ML
25 INJECTION, SOLUTION INTRAMUSCULAR; INTRAVENOUS
Status: DISCONTINUED | OUTPATIENT
Start: 2022-03-15 | End: 2022-03-15 | Stop reason: HOSPADM

## 2022-03-15 RX ORDER — AMOXICILLIN 250 MG
1 CAPSULE ORAL 2 TIMES DAILY
Qty: 28 TABLET | Refills: 0 | Status: SHIPPED
Start: 2022-03-15 | End: 2022-03-15

## 2022-03-15 RX ORDER — OXYCODONE HYDROCHLORIDE 5 MG/1
10 TABLET ORAL
Status: DISCONTINUED | OUTPATIENT
Start: 2022-03-15 | End: 2022-03-15 | Stop reason: HOSPADM

## 2022-03-15 RX ORDER — DEXAMETHASONE SODIUM PHOSPHATE 4 MG/ML
INJECTION, SOLUTION INTRA-ARTICULAR; INTRALESIONAL; INTRAMUSCULAR; INTRAVENOUS; SOFT TISSUE AS NEEDED
Status: DISCONTINUED | OUTPATIENT
Start: 2022-03-15 | End: 2022-03-15 | Stop reason: HOSPADM

## 2022-03-15 RX ORDER — SODIUM CHLORIDE, SODIUM LACTATE, POTASSIUM CHLORIDE, CALCIUM CHLORIDE 600; 310; 30; 20 MG/100ML; MG/100ML; MG/100ML; MG/100ML
50 INJECTION, SOLUTION INTRAVENOUS CONTINUOUS
Status: DISCONTINUED | OUTPATIENT
Start: 2022-03-15 | End: 2022-03-15 | Stop reason: HOSPADM

## 2022-03-15 RX ORDER — HYDROXYZINE HYDROCHLORIDE 10 MG/1
10 TABLET, FILM COATED ORAL
Status: DISCONTINUED | OUTPATIENT
Start: 2022-03-15 | End: 2022-03-15 | Stop reason: HOSPADM

## 2022-03-15 RX ORDER — AMOXICILLIN 250 MG
1 CAPSULE ORAL 2 TIMES DAILY
Qty: 28 TABLET | Refills: 0 | Status: SHIPPED | OUTPATIENT
Start: 2022-03-15 | End: 2022-03-29

## 2022-03-15 RX ORDER — HYDROMORPHONE HYDROCHLORIDE 1 MG/ML
1 INJECTION, SOLUTION INTRAMUSCULAR; INTRAVENOUS; SUBCUTANEOUS
Status: DISCONTINUED | OUTPATIENT
Start: 2022-03-15 | End: 2022-03-15 | Stop reason: HOSPADM

## 2022-03-15 RX ORDER — FACIAL-BODY WIPES
10 EACH TOPICAL DAILY PRN
Status: DISCONTINUED | OUTPATIENT
Start: 2022-03-17 | End: 2022-03-15 | Stop reason: HOSPADM

## 2022-03-15 RX ORDER — ESCITALOPRAM OXALATE 10 MG/1
20 TABLET ORAL
Status: DISCONTINUED | OUTPATIENT
Start: 2022-03-15 | End: 2022-03-15 | Stop reason: HOSPADM

## 2022-03-15 RX ORDER — OXYCODONE HYDROCHLORIDE 5 MG/1
5 TABLET ORAL
Qty: 42 TABLET | Refills: 0 | Status: SHIPPED | OUTPATIENT
Start: 2022-03-15 | End: 2022-03-15

## 2022-03-15 RX ORDER — POLYETHYLENE GLYCOL 3350 17 G/17G
17 POWDER, FOR SOLUTION ORAL DAILY
Qty: 14 PACKET | Refills: 0 | Status: SHIPPED
Start: 2022-03-16 | End: 2022-03-15

## 2022-03-15 RX ORDER — TRAZODONE HYDROCHLORIDE 100 MG/1
150 TABLET ORAL
Status: DISCONTINUED | OUTPATIENT
Start: 2022-03-15 | End: 2022-03-15 | Stop reason: HOSPADM

## 2022-03-15 RX ORDER — OXYCODONE HYDROCHLORIDE 5 MG/1
5 TABLET ORAL
Qty: 42 TABLET | Refills: 0 | Status: SHIPPED | OUTPATIENT
Start: 2022-03-15 | End: 2022-03-22

## 2022-03-15 RX ORDER — PHENYLEPHRINE HCL IN 0.9% NACL 0.4MG/10ML
SYRINGE (ML) INTRAVENOUS AS NEEDED
Status: DISCONTINUED | OUTPATIENT
Start: 2022-03-15 | End: 2022-03-15 | Stop reason: HOSPADM

## 2022-03-15 RX ORDER — ASPIRIN 325 MG
325 TABLET, DELAYED RELEASE (ENTERIC COATED) ORAL 2 TIMES DAILY
Status: DISCONTINUED | OUTPATIENT
Start: 2022-03-15 | End: 2022-03-15 | Stop reason: HOSPADM

## 2022-03-15 RX ORDER — LOSARTAN POTASSIUM 50 MG/1
50 TABLET ORAL DAILY
Status: DISCONTINUED | OUTPATIENT
Start: 2022-03-16 | End: 2022-03-15 | Stop reason: HOSPADM

## 2022-03-15 RX ORDER — POLYETHYLENE GLYCOL 3350 17 G/17G
17 POWDER, FOR SOLUTION ORAL DAILY
Status: DISCONTINUED | OUTPATIENT
Start: 2022-03-16 | End: 2022-03-15 | Stop reason: HOSPADM

## 2022-03-15 RX ORDER — SODIUM CHLORIDE 9 MG/ML
125 INJECTION, SOLUTION INTRAVENOUS CONTINUOUS
Status: DISCONTINUED | OUTPATIENT
Start: 2022-03-15 | End: 2022-03-15 | Stop reason: HOSPADM

## 2022-03-15 RX ORDER — ONDANSETRON 2 MG/ML
INJECTION INTRAMUSCULAR; INTRAVENOUS AS NEEDED
Status: DISCONTINUED | OUTPATIENT
Start: 2022-03-15 | End: 2022-03-15 | Stop reason: HOSPADM

## 2022-03-15 RX ORDER — NALOXONE HYDROCHLORIDE 0.4 MG/ML
0.4 INJECTION, SOLUTION INTRAMUSCULAR; INTRAVENOUS; SUBCUTANEOUS AS NEEDED
Status: DISCONTINUED | OUTPATIENT
Start: 2022-03-15 | End: 2022-03-15 | Stop reason: HOSPADM

## 2022-03-15 RX ORDER — TRANEXAMIC ACID 100 MG/ML
INJECTION, SOLUTION INTRAVENOUS AS NEEDED
Status: DISCONTINUED | OUTPATIENT
Start: 2022-03-15 | End: 2022-03-15 | Stop reason: HOSPADM

## 2022-03-15 RX ORDER — HYDROMORPHONE HYDROCHLORIDE 2 MG/ML
INJECTION, SOLUTION INTRAMUSCULAR; INTRAVENOUS; SUBCUTANEOUS AS NEEDED
Status: DISCONTINUED | OUTPATIENT
Start: 2022-03-15 | End: 2022-03-15 | Stop reason: HOSPADM

## 2022-03-15 RX ORDER — NEOSTIGMINE METHYLSULFATE 1 MG/ML
INJECTION, SOLUTION INTRAVENOUS AS NEEDED
Status: DISCONTINUED | OUTPATIENT
Start: 2022-03-15 | End: 2022-03-15 | Stop reason: HOSPADM

## 2022-03-15 RX ORDER — DEXTROAMPHETAMINE SACCHARATE, AMPHETAMINE ASPARTATE MONOHYDRATE, DEXTROAMPHETAMINE SULFATE AND AMPHETAMINE SULFATE 1.25; 1.25; 1.25; 1.25 MG/1; MG/1; MG/1; MG/1
30 CAPSULE, EXTENDED RELEASE ORAL DAILY
Status: DISCONTINUED | OUTPATIENT
Start: 2022-03-16 | End: 2022-03-15 | Stop reason: HOSPADM

## 2022-03-15 RX ORDER — OXYCODONE HYDROCHLORIDE 5 MG/1
5 TABLET ORAL
Status: DISCONTINUED | OUTPATIENT
Start: 2022-03-15 | End: 2022-03-15 | Stop reason: HOSPADM

## 2022-03-15 RX ADMIN — ROCURONIUM BROMIDE 10 MG: 10 SOLUTION INTRAVENOUS at 10:26

## 2022-03-15 RX ADMIN — HYDROMORPHONE HYDROCHLORIDE 0.2 MG: 1 INJECTION, SOLUTION INTRAMUSCULAR; INTRAVENOUS; SUBCUTANEOUS at 13:10

## 2022-03-15 RX ADMIN — HYDROMORPHONE HYDROCHLORIDE 0.2 MG: 1 INJECTION, SOLUTION INTRAMUSCULAR; INTRAVENOUS; SUBCUTANEOUS at 12:05

## 2022-03-15 RX ADMIN — ROCURONIUM BROMIDE 40 MG: 10 SOLUTION INTRAVENOUS at 09:10

## 2022-03-15 RX ADMIN — SODIUM CHLORIDE, POTASSIUM CHLORIDE, SODIUM LACTATE AND CALCIUM CHLORIDE 50 ML/HR: 600; 310; 30; 20 INJECTION, SOLUTION INTRAVENOUS at 07:43

## 2022-03-15 RX ADMIN — TRANEXAMIC ACID 1 G: 100 INJECTION, SOLUTION INTRAVENOUS at 09:30

## 2022-03-15 RX ADMIN — HYDROMORPHONE HYDROCHLORIDE 0.5 MG: 2 INJECTION, SOLUTION INTRAMUSCULAR; INTRAVENOUS; SUBCUTANEOUS at 12:02

## 2022-03-15 RX ADMIN — Medication 80 MCG: at 09:33

## 2022-03-15 RX ADMIN — GLYCOPYRROLATE 0.8 MG: 0.2 INJECTION, SOLUTION INTRAMUSCULAR; INTRAVENOUS at 11:43

## 2022-03-15 RX ADMIN — WATER 2 G: 1 INJECTION INTRAMUSCULAR; INTRAVENOUS; SUBCUTANEOUS at 16:00

## 2022-03-15 RX ADMIN — ONDANSETRON HYDROCHLORIDE 4 MG: 2 INJECTION, SOLUTION INTRAMUSCULAR; INTRAVENOUS at 09:30

## 2022-03-15 RX ADMIN — SODIUM CHLORIDE, POTASSIUM CHLORIDE, SODIUM LACTATE AND CALCIUM CHLORIDE: 600; 310; 30; 20 INJECTION, SOLUTION INTRAVENOUS at 09:04

## 2022-03-15 RX ADMIN — Medication 120 MCG: at 09:23

## 2022-03-15 RX ADMIN — PHENYLEPHRINE HYDROCHLORIDE 40 MCG/MIN: 10 INJECTION INTRAVENOUS at 09:23

## 2022-03-15 RX ADMIN — FENTANYL CITRATE 50 MCG: 50 INJECTION, SOLUTION INTRAMUSCULAR; INTRAVENOUS at 09:10

## 2022-03-15 RX ADMIN — DEXAMETHASONE SODIUM PHOSPHATE 4 MG: 4 INJECTION, SOLUTION INTRAMUSCULAR; INTRAVENOUS at 09:30

## 2022-03-15 RX ADMIN — ROCURONIUM BROMIDE 10 MG: 10 SOLUTION INTRAVENOUS at 10:33

## 2022-03-15 RX ADMIN — HYDROMORPHONE HYDROCHLORIDE 0.2 MG: 1 INJECTION, SOLUTION INTRAMUSCULAR; INTRAVENOUS; SUBCUTANEOUS at 12:35

## 2022-03-15 RX ADMIN — SODIUM CHLORIDE, POTASSIUM CHLORIDE, SODIUM LACTATE AND CALCIUM CHLORIDE: 600; 310; 30; 20 INJECTION, SOLUTION INTRAVENOUS at 12:01

## 2022-03-15 RX ADMIN — MIDAZOLAM 2 MG: 1 INJECTION INTRAMUSCULAR; INTRAVENOUS at 09:03

## 2022-03-15 RX ADMIN — FENTANYL CITRATE 25 MCG: 50 INJECTION, SOLUTION INTRAMUSCULAR; INTRAVENOUS at 13:15

## 2022-03-15 RX ADMIN — SODIUM CHLORIDE, PRESERVATIVE FREE 5 ML: 5 INJECTION INTRAVENOUS at 14:00

## 2022-03-15 RX ADMIN — NEOSTIGMINE METHYLSULFATE 4 MG: 1 INJECTION, SOLUTION INTRAVENOUS at 11:43

## 2022-03-15 RX ADMIN — HYDROMORPHONE HYDROCHLORIDE 1 MG: 2 INJECTION, SOLUTION INTRAMUSCULAR; INTRAVENOUS; SUBCUTANEOUS at 09:48

## 2022-03-15 RX ADMIN — HYDRALAZINE HYDROCHLORIDE 10 MG: 20 INJECTION INTRAMUSCULAR; INTRAVENOUS at 11:36

## 2022-03-15 RX ADMIN — FENTANYL CITRATE 25 MCG: 50 INJECTION, SOLUTION INTRAMUSCULAR; INTRAVENOUS at 13:20

## 2022-03-15 RX ADMIN — FENTANYL CITRATE 50 MCG: 50 INJECTION, SOLUTION INTRAMUSCULAR; INTRAVENOUS at 12:02

## 2022-03-15 RX ADMIN — ACETAMINOPHEN 1000 MG: 500 TABLET ORAL at 14:09

## 2022-03-15 RX ADMIN — HYDROMORPHONE HYDROCHLORIDE 0.2 MG: 1 INJECTION, SOLUTION INTRAMUSCULAR; INTRAVENOUS; SUBCUTANEOUS at 12:50

## 2022-03-15 RX ADMIN — SODIUM CHLORIDE 125 ML/HR: 9 INJECTION, SOLUTION INTRAVENOUS at 13:35

## 2022-03-15 RX ADMIN — WATER 2 G: 1 INJECTION INTRAMUSCULAR; INTRAVENOUS; SUBCUTANEOUS at 09:17

## 2022-03-15 RX ADMIN — HYDROMORPHONE HYDROCHLORIDE 0.5 MG: 2 INJECTION, SOLUTION INTRAMUSCULAR; INTRAVENOUS; SUBCUTANEOUS at 10:26

## 2022-03-15 RX ADMIN — OXYCODONE 5 MG: 5 TABLET ORAL at 16:10

## 2022-03-15 RX ADMIN — LIDOCAINE HYDROCHLORIDE 100 MG: 20 INJECTION, SOLUTION EPIDURAL; INFILTRATION; INTRACAUDAL; PERINEURAL at 09:10

## 2022-03-15 RX ADMIN — ONDANSETRON HYDROCHLORIDE 4 MG: 2 SOLUTION INTRAMUSCULAR; INTRAVENOUS at 13:00

## 2022-03-15 RX ADMIN — HYDROMORPHONE HYDROCHLORIDE 0.2 MG: 1 INJECTION, SOLUTION INTRAMUSCULAR; INTRAVENOUS; SUBCUTANEOUS at 12:20

## 2022-03-15 RX ADMIN — ROCURONIUM BROMIDE 20 MG: 10 SOLUTION INTRAVENOUS at 09:45

## 2022-03-15 RX ADMIN — PROPOFOL 200 MG: 10 INJECTION, EMULSION INTRAVENOUS at 09:10

## 2022-03-15 NOTE — PROGRESS NOTES
ALEX: Plan for discharge home, patient prefers outpatient PT. Ortho PA aware. Patient plans to call [facility] in the morning to coordinate outpatient PT. Patient owns walker, and plans to purchase a cane. Family will transport patient home. Care Management Interventions  PCP Verified by CM: Yes  Mode of Transport at Discharge: Other (see comment) (family/car)  Transition of Care Consult (CM Consult): Discharge Planning  MyChart Signup: No  Discharge Durable Medical Equipment: No  Physical Therapy Consult: Yes  Occupational Therapy Consult: Yes  Support Systems: Spouse/Significant Other  Confirm Follow Up Transport: Family  The Patient and/or Patient Representative was Provided with a Choice of Provider and Agrees with the Discharge Plan?: Yes  Freedom of Choice List was Provided with Basic Dialogue that Supports the Patient's Individualized Plan of Care/Goals, Treatment Preferences and Shares the Quality Data Associated with the Providers?: Yes  Discharge Location  Patient Expects to be Discharged to[de-identified] Home with outpatient services    [place] [facility] Notice (Andrew Ness) provided to patient/representative with verbal explanation of the notice. Time allotted for questions regarding the notice. Patient /representative provided a completed copy of the MOON/VOON notice. Copy placed on bedside chart.     MARGARITA Hernandez/CRM

## 2022-03-15 NOTE — ANESTHESIA PREPROCEDURE EVALUATION
Relevant Problems   No relevant active problems       Anesthetic History   No history of anesthetic complications            Review of Systems / Medical History  Patient summary reviewed, nursing notes reviewed and pertinent labs reviewed    Pulmonary          Smoker  Asthma        Neuro/Psych         Psychiatric history     Cardiovascular    Hypertension                   GI/Hepatic/Renal     GERD      PUD     Endo/Other        Arthritis     Other Findings              Physical Exam    Airway  Mallampati: II  TM Distance: 4 - 6 cm  Neck ROM: normal range of motion   Mouth opening: Normal     Cardiovascular  Regular rate and rhythm,  S1 and S2 normal,  no murmur, click, rub, or gallop             Dental  No notable dental hx       Pulmonary  Breath sounds clear to auscultation               Abdominal  GI exam deferred       Other Findings            Anesthetic Plan    ASA: 2  Anesthesia type: general          Induction: Intravenous  Anesthetic plan and risks discussed with: Patient

## 2022-03-15 NOTE — PROGRESS NOTES
PHYSICAL THERAPY EVALUATION/DISCHARGE  Patient: Alejandro Souza (44 y.o. female)  Date: 3/15/2022  Primary Diagnosis: Primary osteoarthritis of right hip [M16.11]  Status post surgery [Z98.890]  Procedure(s) (LRB):  RIGHT CONVERSION TO A TOTAL HIP (Right) Day of Surgery   Precautions:   WBAT      ASSESSMENT  Based on the objective data described below, the patient presents POD# 0 with right hip pain, decreased AROM/strength and function right leg, decreased activity tolerance, decline in functional mobility and impaired standing balance/gait without assistive device. Pt mobilized easily with no c/o dizziness. Pt instructed in post op exercise and performed safe and indep transfers/amb with RW.  present - who is also RN will be providing supervision/standby initially post discharge. Pt is cleared from PT standpoint for discharge. Functional Outcome Measure: The patient scored 70/100 on the Barthel Index  outcome measure. Other factors to consider for discharge: s/p Right TKR in past,  RN, no stairs/ramps     Further skilled acute physical therapy is not indicated at this time. PLAN :  Recommendation for discharge: (in order for the patient to meet his/her long term goals)  Outpatient physical therapy follow up recommended for RIght THR rehab. This discharge recommendation:  Has been made in collaboration with the attending provider and/or case management    IF patient discharges home will need the following DME: patient owns DME required for discharge       SUBJECTIVE:   Patient stated I think that pop I felt - was just a tendon - it really did not hurt - just scared me.    NP - Christopher Ivey assessed pt after PT session.     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    PUD (peptic ulcer disease) 2008    Scoliosis      Past Surgical History:   Procedure Laterality Date    HX GI  2008    COLONOSCOPY AND ENDOSCOPY    HX KNEE ARTHROSCOPY Right 2017    HX KNEE REPLACEMENT Right 06/2020    HX ORTHOPAEDIC Right 2021    [address] HIP    HX TUBAL LIGATION  2010     PLOF:  Independent   Personal factors and/or comorbidities impacting plan of care: as above    Home Situation  Home Environment: Private residence  # Steps to Enter: 0  Wheelchair Ramp: Yes  One/Two Story Residence: One story  Living Alone: No  Support Systems: Spouse/Significant Other  Patient Expects to be Discharged to[de-identified] Home with outpatient services  Current DME Used/Available at Home: Cane, straight,Walker, rolling  Tub or Shower Type: Shower (fixed bench)    EXAMINATION/PRESENTATION/DECISION MAKING:   Critical Behavior:  Neurologic State: Alert  Orientation Level: Oriented X4  Cognition: Follows commands  Safety/Judgement: Awareness of environment,Good awareness of safety precautions  Range Of Motion:  AROM: Within functional limits (except right hip)           PROM: Within functional limits (except right hip)           Strength:    Strength: Within functional limits (except right hip)                    Tone & Sensation:   Tone: Normal              Sensation: Intact               Coordination:  Coordination: Within functional limits  Functional Mobility:  Bed Mobility:  Rolling: Supervision  Supine to Sit: Supervision  Sit to Supine: Supervision  Scooting: Supervision  Transfers:  Sit to Stand: Stand-by assistance  Stand to Sit: Stand-by assistance                       Balance:   Sitting: Intact; Without support  Standing: Intact; With support  Ambulation/Gait Training:  Distance (ft): 100 Feet (ft)  Assistive Device: Walker, rolling;Gait belt  Ambulation - Level of Assistance: Stand-by assistance        Gait Abnormalities: Decreased step clearance        Base of Support: Center of gravity altered;Shift to left  Stance: Right decreased  Speed/Brigette: Slow  Step Length: Right shortened;Left shortened  Swing Pattern: Right asymmetrical         Stairs:   Deferred - pt has ramps to enter/exit home. Therapeutic Exercises:   Pt issued THR Binder -  reviewed supine exercise instructions as patient instructed and performed use of incentive spirometer x 10 hr and ankle pumps x 10 hr when awake. Also reviewed and performed quad sets, gluteal sets and heel slides. Functional Measure:  Barthel Index:    Bathin  Bladder: 10  Bowels: 10  Groomin  Dressin  Feeding: 10  Mobility: 10  Stairs: 5  Toilet Use: 5  Transfer (Bed to Chair and Back): 10  Total: 70/100       The Barthel ADL Index: Guidelines  1. The index should be used as a record of what a patient does, not as a record of what a patient could do. 2. The main aim is to establish degree of independence from any help, physical or verbal, however minor and for whatever reason. 3. The need for supervision renders the patient not independent. 4. A patient's performance should be established using the best available evidence. Asking the patient, friends/relatives and nurses are the usual sources, but direct observation and common sense are also important. However direct testing is not needed. 5. Usually the patient's performance over the preceding 24-48 hours is important, but occasionally longer periods will be relevant. 6. Middle categories imply that the patient supplies over 50 per cent of the effort. 7. Use of aids to be independent is allowed. Score Interpretation (from [facility] 83)    Independent   60-79 Minimally independent   40-59 Partially dependent   20-39 Very dependent   <20 Totally dependent     -Jay Archer., Barthel, D.W. (1965). Functional evaluation: the Barthel Index. [address] [facility] ([address] [place] [address] (1997).  The Barthel activities of daily living index: self-reporting versus actual performance in the old (> or = 75 years). Journal of [address] [place] [address] 45(6), [facility], LUIS, Kevin Bach., Ophelia Sigala. (1999). Measuring the change in disability after inpatient rehabilitation; comparison of the responsiveness of the Barthel Index and Functional [place] Measure. Journal of Neurology, Neurosurgery, and Psychiatry, 66(4), 514-842. QUYEN Jarrell, LINCOLN Gay, & Kimberley Enriquez M.A. (2004) Assessment of post-stroke quality of life in cost-effectiveness studies: The usefulness of the Barthel Index and the EuroQoL-5D. Quality of Life Research, 15, 732-00          Physical Therapy Evaluation Charge Determination   History Examination Presentation Decision-Making   LOW Complexity : Zero comorbidities / personal factors that will impact the outcome / POC LOW Complexity : 1-2 Standardized tests and measures addressing body structure, function, activity limitation and / or participation in recreation  LOW Complexity : Stable, uncomplicated  LOW Complexity : FOTO score of       Based on the above components, the patient evaluation is determined to be of the following complexity level: LOW     Pain Rating:  Right hip 1/10    Activity Tolerance:   Good  - POD# 0 Right THR - cleared by PT for discharge    After treatment patient left in no apparent distress:   Supine in bed, Call bell within reach, Caregiver / family present, Side rails x 3 and NP - Harlee Mortimer at bedside. COMMUNICATION/EDUCATION:   The patients plan of care was discussed with: Occupational therapist, Registered nurse and NP - Harlee Mortimer. .     Fall prevention education was provided and the patient/caregiver indicated understanding.     Thank you for this referral.  Eber Reynolds, PT   Time Calculation: 30 mins

## 2022-03-15 NOTE — ANESTHESIA POSTPROCEDURE EVALUATION
Post-Anesthesia Evaluation and Assessment    Patient: David Barker MRN: [id number]  SSN: [id number]    YOB: 1980  Age: 39 y.o. Sex: female      I have evaluated the patient and they are stable and ready for discharge from the PACU. Cardiovascular Function/Vital Signs  Visit Vitals  BP (!) 140/77   Pulse 87   Temp 36.7 °C (98 °F)   Resp 12   Ht 5' 10\" (1.778 m)   Wt 97.6 kg (215 lb 2.7 oz)   SpO2 100%   BMI 30.87 kg/m²       Patient is status post General anesthesia for Procedure(s):  RIGHT CONVERSION TO A TOTAL HIP. Nausea/Vomiting: None    Postoperative hydration reviewed and adequate. Pain:  Pain Scale 1: Numeric (0 - 10) (03/15/22 0717)  Pain Intensity 1: 2 (03/15/22 0717)   Managed    Neurological Status:   Neuro (WDL): Within Defined Limits (03/15/22 0733)   At baseline    Mental Status, Level of Consciousness: Alert and  oriented to person, place, and time    Pulmonary Status:   O2 Device: Nasal cannula (03/15/22 1200)   Adequate oxygenation and airway patent    Complications related to anesthesia: None    Post-anesthesia assessment completed.  No concerns    Signed By: Coby Duarte MD     March 15, 2022

## 2022-03-15 NOTE — OP NOTES
OPERATIVE REPORT  CONVERSION RIGHT TOTAL HIP REPLACEMENT (ANTERIOR APPROACH)    NAME: Sean Crockett  MRN: [id number]  :  1980  AGE: 39 y.o. DATE OF SURGERY:  3/15/2022    PREOPERATIVE DIAGNOSIS: Severe degenerative joint disease, right hip. POSTOPERATIVE DIAGNOSIS: Severe degenerative joint disease, right hip. PROCEDURES PERFORMED: Right total hip replacement - Anterior approach    SURGEON: Derian Morales MD.    ASSISTANT: Elke Mar MD    ANESTHESIA: General    ESTIMATED BLOOD LOSS: 300 mL. DRAINS: None. COMPLICATIONS: None. SPECIMENS REMOVED: None. PRE-OP ANTIBIOTIC: Ancef 2 gram    IMPLANT:   Implant Name Type Inv.  Item Serial No.  Lot No. LRB No. Used Action   ELIMINATOR H APEX FOR 48-60MM PINN HIP SHELL - SN/A  ELIMINATOR H APEX FOR 48-60MM PINN HIP SHELL N/A [facility] [id number] Right 1 Implanted   CUP ACET [id number] HIP GRIPTION CHANCE CEMENTLESS FIX SECT SER - SNA  CUP ACET [id number] HIP GRIPTION CHANCE CEMENTLESS FIX SECT SER NA [facility] [employer] [facility] [id number] Right 1 Implanted   SCREW BNE L35MM DIA6.5MM CANC HIP DOME PINN - SNA  SCREW BNE L35MM DIA6.5MM CANC HIP DOME PINN NA [facility] [id number] Right 1 Implanted   SCR ACET CANC PINN 6.5X25MM SS --  - SNA  SCR ACET CANC PINN 6.5X25MM SS --  NA [facility] [employer] [facility] [id number] Right 1 Implanted   LINER ACET OD52MM ID36MM HIP ALTRX PINN - SNA  LINER ACET OD52MM ID36MM HIP ALTRX PINN NA [facility] [employer] [facility] [id number] Right 1 Implanted   STEM FEM SZ 6 L107MM 12/14 TAPR STD OFFSET HIP DUOFIX CLLRD - SNA  STEM FEM SZ 6 L107MM 12/14 TAPR STD OFFSET HIP DUOFIX CLLRD NA [facility] [id number] Right 1 Implanted   HEAD FEM [id number] +5MM OFFSET 12/14 TAPR HIP CERAMIC BIOLOX - SNA  HEAD FEM [id number] +5MM OFFSET 12/14 TAPR HIP CERAMIC BIOLOX NA [facility] [employer] [facility] [id number] Right 1 Implanted       INDICATIONS: 39 yrs female  with severe DJD of the right hip. Patient has prior hip surgery with placement of hardware to repair her labrum. The patient's right hip has been progressive in terms of symptoms. The patient now has severe activity limitation. The patient has continued with conservative management without adequate relief or improvement of functional limitations. We discussed options and she wished to proceed with  conversion to right total hip replacement. The patient has continued with conservative management without adequate relief or improvement of functional limitations. DESCRIPTION OF PROCEDURE: Anesthetic was initiated. Preoperative dose of IV Ancef was given. Pacheco catheter was not placed. The right side was confirmed as the operative side, prepped and draped in the usual sterile fashion. Skin was covered with Ioban occlusive dressing. Direct anterior exposure was made to the patient's hip through the sartorius tensor interval. Anterior hip vasculature was cauterized. Retractors were taken out to observe for bleeding and there was none. The capsule was identified, opened and T'd distally. The femoral neck was osteotomized. Femoral head was removed from the acetabulum, which was exposed and soft tissues were removed. The acetabulum was progressively reamed to 51 and a 52 trial shell was impacted with good press-fit. This was removed and a 52 shell was impacted in the acetabulum in 40 degrees of abduction in an anatomic-type anteversion. Bone spurs were removed and 6.5 screws x2 were placed. The polyethylene liner was placed. Femur was positioned and elevated from the wound. The medullary canal was entered. Flexible reamers were not utilized as the patient did not have a narrowed femoral canal, broached to a size 6. Calcar planed and then trialed. A 36 +5 hip ball was the most appropriate for leg length and tension with a standard offset stem. The hip was dislocated.  The anterior greater trochanter was trimmed down to enhance flexion, rotation and stability. The trial was removed and the real stem was impacted. The real hip ball was placed. The hip was reduced. After copious irrigation, the capsule was closed with #2 Vicryl sutures. I irrigated the skin, subcutaneous and deep wound. I closed the fascia of the tensor fascia dalila with #2 Vicryl sutures. Skin and subcutaneous were irrigated. Soft tissues were infiltrated with local anesthetic. Skin and subcutaneous were closed in a standard fashion. Sterile dressing was applied. There were no complications. No specimen was sent. The procedure was a CONVERSION RIGHT TOTAL HIP REPLACEMENT using a Depuy total hip construct. The patient was transferred to the recovery room in stable condition. Sridhar Patricia MD was critical throughout the case to assist with positioning, retraction and closure. There were no other available residents or surgical assistants to assist during this procedure.     Mckenna Koch MD

## 2022-03-15 NOTE — PROGRESS NOTES
Ortho NP Note    POD# 0  s/p RIGHT CONVERSION TO A TOTAL HIP     Pt resting in bed. States pain is 0.5/10 after receiving medication in PACU. Patient states she would like to be discharge to home today with OP therapy follow up if at all possible as she previously was a day 0 discharge for R knee. Have DME at home, no steps. Patient has had something to eat/drink. No nausea. VSS Afebrile. Visit Vitals  /69   Pulse 80   Temp 97.8 °F (36.6 °C)   Resp 17   Ht 5' 10\" (1.778 m)   Wt 97.6 kg (215 lb 2.7 oz)   SpO2 100%   BMI 30.87 kg/m²         Most Recent Labs:   Lab Results   Component Value Date/Time    HGB 15.8 03/01/2022 03:03 PM    Hemoglobin A1c 5.1 03/01/2022 03:03 PM       Body mass index is 30.87 kg/m². Reference: BMI greater than 30 is classified as obesity and greater than 40 is classified as morbid obesity. STOP BANG Score: 2    Voiding status: due to void--will await spontaneous void before discharging. Aquacel to R hip c.d.i. Cryotherapy in place over incision. Bilateral LEs warm, dry. 2+ DP pulses  Sensation and motor intact. DF/PF/EHL 5/5. Foot Pumps for mechanical DVT proph    Plan:  1) PT: starting today, WBAT. Will require clearance from PT prior to discharge. 2) Neeru-op Antibiotics Ancef  3) Pain:  Perioperative anesthesia: General.  Post operative analgesia includes scheduled tylenol and PRN oxycodone or IV dilaudid. 4) DVT Prophylaxis:  Aspirin 325 mg PO BID. Encouraged early mobilization, bed exercises, and SCD use. 5) Hx of HTN: Current /69, HR 80. Resume losartan tomorrow morning if SBP > 125 as per protocol. 6) Discharge plans: to home with 's support and outpatient therapy. Has DME at home. Called to Abdullahi and left message requesting referral to [facility] in [address].     Discussed discharge including return precautions, medications ordered ([facility]), wound care, and ongoing therapy--patient and family expressed understanding. Valeri Henderson NP    Addendum: [phone]: patient cleared therapy (PT, OT). Spontaneous void, eating dinner. Have called and left message Maria Antonia regarding OP therapy. Have not yet heard back. Discussed this with patient and significant other who know that this has not yet been set up. They know that they will need to call to set up in the outpatient setting and are okay assuming this responsibility. Dereje MARTÍNEZ aware as well.

## 2022-03-15 NOTE — PROGRESS NOTES
I have reviewed discharge instructions with the patient. The patient verbalized understanding. Discharge paperwork provided and reviewed. No further questions at this time.  Patient sent home with belongings

## 2022-03-15 NOTE — PROGRESS NOTES
OCCUPATIONAL THERAPY EVALUATION/DISCHARGE  Patient: Riky Mccord (44 y.o. female)  Date: 3/15/2022  Primary Diagnosis: Primary osteoarthritis of right hip [M16.11]  Status post surgery [Z98.890]  Procedure(s) (LRB):  RIGHT CONVERSION TO A TOTAL HIP (Right) Day of Surgery   Precautions:  WBAT    ASSESSMENT  Based on the objective data described below, the patient presents with c/o R hip pain, decreased higher level mobility/balance and decreased higher level activity tolerance; however, she is demonstrating a high level of safe functional independence, completing dynamic ADL routine, at RW level with SBA and good understanding of modified dressing techniques, as well as, good understanding of avoiding extreme combination of movements. Pt reports residing with her boyfriend, who is RN, and states he is able to provide PRN ADL/IADL assist.  Given pt's current high level of safe functional independence, DME/AE needs fulfilled, good in-home assist available and CM reporting pt is planned for outpatient physical therapy once discharged, no other acute OT needs identified, with OT answering pt's questions/concerns and pt thanking therapist for his efforts. Current Level of Function (ADLs/self-care): SBA    Functional Outcome Measure: The patient scored 55/100 on the Barthel Index outcome measure. Other factors to consider for discharge: none     PLAN :  Recommendation for discharge: (in order for the patient to meet his/her long term goals)  No skilled occupational therapy/ follow up rehabilitation needs identified at this time. This discharge recommendation:  Has been made in collaboration with the attending provider and/or case management    IF patient discharges home will need the following DME: patient owns DME required for discharge       SUBJECTIVE:   Patient stated My boyfriend is an RN and he works from home so he can help me out. 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    PUD (peptic ulcer disease) 2008    Scoliosis      Past Surgical History:   Procedure Laterality Date    HX GI  2008    COLONOSCOPY AND ENDOSCOPY    HX KNEE ARTHROSCOPY Right 2017    HX KNEE REPLACEMENT Right 06/2020    HX ORTHOPAEDIC Right 2021    [address] HIP    HX TUBAL LIGATION  2010       Prior Level of Function/Environment/Context: Independent, works at [employer] or extensive additional review of patient history:     Home Situation  Home Environment: Private residence  # Steps to Enter: 0  Wheelchair Ramp: Yes  One/Two Story Residence: One story  Living Alone: No  Support Systems: Spouse/Significant Other  Patient Expects to be Discharged to[de-identified] Home  Current DME Used/Available at Home: Cane, straight,Walker, rolling  Tub or Shower Type: Shower (fixed bench)    Hand dominance: Right    EXAMINATION OF PERFORMANCE DEFICITS:  Cognitive/Behavioral Status:  Neurologic State: Alert  Orientation Level: Oriented X4  Cognition: Follows commands  Perception: Appears intact  Perseveration: No perseveration noted  Safety/Judgement: Awareness of environment;Good awareness of safety precautions      Range of Motion:  AROM: Within functional limits (aside from RLE)  PROM: Within functional limits (aside from RLE)                      Strength:  Strength: Within functional limits (aside from RLE)                Coordination:  Coordination: Within functional limits  Fine Motor Skills-Upper: Left Intact; Right Intact    Gross Motor Skills-Upper: Left Intact; Right Intact    Tone & Sensation:  Tone: Normal  Sensation: Intact                      Balance:  Sitting: Intact  Standing: Intact; With support    Functional Mobility and Transfers for ADLs:  Bed Mobility:  Rolling: Supervision  Supine to Sit: Supervision  Sit to Supine: Supervision  Scooting: Supervision    Transfers:  Sit to Stand: Stand-by assistance  Stand to Sit: Stand-by assistance  Bathroom Mobility: Stand-by assistance    ADL Assessment:  The patient recalled and demonstrated hip contraindications Right LE during ADLs and functional mobility with Min cues. Feeding: Independent    Oral Facial Hygiene/Grooming: Stand-by assistance    Bathing: Supervision    Upper Body Dressing: Independent    Lower Body Dressing: Stand-by assistance    Toileting: Stand by assistance    ADL Intervention and task modifications:  Patient instructed and indicated understanding the benefits of maintaining activity tolerance, functional mobility, and independence with self care tasks during acute stay  to ensure safe return home and to baseline. Encouraged patient to increase frequency and duration OOB, be out of bed for all meals, perform daily ADLs (as approved by RN/MD regarding bathing etc), and performing functional mobility to/from bathroom. Pt educated on safe transfer techniques, with specific emphasis on proper hand placement to push up from seated surface rather than attempt to pull self up, fully positioning self in-front of desired seated location, feeling chair on back of legs and reaching back with 1-2 UE to slowly lower self to seated position. Cognitive Retraining  Safety/Judgement: Awareness of environment;Good awareness of safety precautions    Bathing: Patient instructed when bathing to not submerge wound in water, stand to shower or sponge bathe, cover wound with plastic and tape to ensure no water reaches bandage/wound without cues. Patient indicated understanding. Dressing joint: Patient instructed and demonstrated to don/doff Right LE first/last Min cues.   Patient instructed and demonstrated to don all clothing while sitting prior to standing, doff all clothing to knees while standing, then sit to doff clothing off from knees to feet in order to facilitate fall prevention, pain management, and energy conservation with Stand-by assistance. Home safety: Patient instructed on home modifications and safety (raise height of ADL objects, appropriate height of chair surfaces, recliner safety, change of floor surfaces, clear pathways) to increase independence and fall prevention. Patient indicated understanding. Standing: Patient instructed and demonstrated to walk up to sink/counter top/surfaces, step into walker to increase safety of joint and fall prevention with Stand-by assistance. Instructed to apply concept of hip contraindications to ADLs within the home (no extreme movements, square off while using objects, slide objects along surfaces). Patient instructed to increase amount of time standing, observe standing position during ADLs in order to increase even weight bearing through bilateral LEs in order to increase independence with ADLs. Goal to be reached 30 days post - op, per orthopedic surgeon or per PT. Patient indicated understanding. Tub transfer: Patient instructed regarding when it is safe to begin transfer into tub (complete stairs with PT, advance exercises with PT high enough to clear tub height). Patient instructed to use the same technique as used with stairs when entering and exiting tub (\"up with the non-surgical, down with the surgical leg\"). Patient indicated understanding. Functional Measure:    Barthel Index:  Bathin  Bladder: 10  Bowels: 10  Groomin  Dressin  Feeding: 10  Mobility: 0  Stairs: 0  Toilet Use: 5  Transfer (Bed to Chair and Back): 10  Total: 55/100      The Barthel ADL Index: Guidelines  1. The index should be used as a record of what a patient does, not as a record of what a patient could do. 2. The main aim is to establish degree of independence from any help, physical or verbal, however minor and for whatever reason.   3. The need for supervision renders the patient not independent. 4. A patient's performance should be established using the best available evidence. Asking the patient, friends/relatives and nurses are the usual sources, but direct observation and common sense are also important. However direct testing is not needed. 5. Usually the patient's performance over the preceding 24-48 hours is important, but occasionally longer periods will be relevant. 6. Middle categories imply that the patient supplies over 50 per cent of the effort. 7. Use of aids to be independent is allowed. Score Interpretation (from [address] [address])    Independent   60-79 Minimally independent   40-59 Partially dependent   20-39 Very dependent   <20 Totally dependent     -Jay Archer., Barthel, D.W. (1965). Functional evaluation: the Barthel Index. [facility] ([address] (1997). The Barthel activities of daily living index: self-reporting versus actual performance in the old (> or = 75 years). Journal of [address] [place] [address] 45(7), [facility], J.JENNIFER, Ariel Bush., Amparo Hinds. (1999). Measuring the change in disability after inpatient rehabilitation; comparison of the responsiveness of the Barthel Index and Functional [place] Measure. Journal of Neurology, Neurosurgery, and Psychiatry, 66(4), 290-738. Cele Smith, N.J.A, LINCOLN Gay, & Danielle Clifton MRUTH. (2004) Assessment of post-stroke quality of life in cost-effectiveness studies: The usefulness of the Barthel Index and the EuroQoL-5D.  Quality of Life Research, 15, 937-36       Occupational Therapy Evaluation Charge Determination   History Examination Decision-Making   LOW Complexity : Brief history review  MEDIUM Complexity : 3-5 performance deficits relating to physical, cognitive , or psychosocial skils that result in activity limitations and / or participation restrictions LOW Complexity : No comorbidities that affect functional and no verbal or physical assistance needed to complete eval tasks       Based on the above components, the patient evaluation is determined to be of the following complexity level: LOW   Pain Rating:  Reports of R hip pain with movement, did not quantify; RN aware and following    Activity Tolerance:   Good, Fair and requires rest breaks      After treatment patient left in no apparent distress:    Supine in bed, Call bell within reach, Caregiver / family present and Side rails x 3    COMMUNICATION/EDUCATION:   The patients plan of care was discussed with: Physical therapist, Registered nurse, Case management and PA.      Thank you for this referral.  Gene Chowdhury OT  Time Calculation: 22 mins

## 2022-03-15 NOTE — PROGRESS NOTES
Primary Nurse Jian Kim, KAREN and Bijal Rodriguez RN performed a dual skin assessment on this patient No impairment noted  Paxton score is 21

## 2022-03-15 NOTE — PERIOP NOTES
TRANSFER - OUT REPORT:    Verbal report given to Chante Jenkins  (name) on Jose Fish  being transferred to  (unit) for routine post - op       Report consisted of patients Situation, Background, Assessment and   Recommendations(SBAR). Time Pre op antibiotic [id number]  Anesthesia Stop time: [id number]  Pacheco Present on Transfer to floor:no  Order for Pacheco on Chart:no  Discharge Prescriptions with Chart:no    Information from the following report(s) SBAR, OR Summary, Procedure Summary, Intake/Output, MAR, Recent Results and Cardiac Rhythm NSR was reviewed with the receiving nurse. Opportunity for questions and clarification was provided. Is the patient on 02? NO    Is the patient on a monitor? NO    Is the nurse transporting with the patient? NO    Surgical Waiting Area notified of patient's transfer from PACU? YES; left VM with significant other.       The following personal items collected during your admission accompanied patient upon transfer:   Dental Appliance: Dental Appliances: None  Vision:    Hearing Aid:    Jewelry:    Clothing: Clothing:  (clothes and glasses to pacu)  Other Valuables:    Valuables sent to safe:

## 2022-03-15 NOTE — DISCHARGE INSTRUCTIONS
Discharge Instructions Hip Replacement  Dr. Jevon Hernandez      Patient Name  Lucie Klein  Date of procedure  3/15/2022    Procedure  Procedure(s):  RIGHT CONVERSION TO A TOTAL HIP  Surgeon  Surgeon(s) and Role:     * Jason Joiner MD - Primary     * Jean Paul Vogel MD - Assisting  Date of discharge: No discharge date for patient encounter. PCP: Deja Hobson MD    Follow up care   Follow up visit with Dr. Jevon Hernandez in 3 weeks. Call [phone]  to make an appointment    Activity at home   AVOID sudden and extreme movement of your hip (surgical leg)   Take a short walk every hour; except at night when sleeping   Do your Home Exercise Program 3 times every day    After exercising lie down and elevate your leg on pillows for 15-30 minutes to decrease swelling   Refer to your patient notebook for more information    Bathing and caring for your incision   You may take a shower with your waterproof dressing on your hip.  The waterproof dressing is to stay on your hip for 7 days.  On the 7th day have someone gently peel the dressing off by lifting the edge and stretching it to break the seal.   You may then leave your incision open to air unless you see drainage from your hip. Preventing blood clots   Take Aspirin every day as prescribed.  Call Dr. Jevon Hernandez if you have side effects of blood thinning medication: bleeding, bruising, upset stomach, or diarrhea.  Call Dr. Jevon Hernandez for signs of a blood clot in your leg: calf pain, tenderness, redness, swelling of lower leg    Preventing lung congestion   Use your incentive spirometer 4 times a day; do 10 repetitions each time   Remember to keep the small blue ball between the two arrows when taking a slow, deep breath           Pain Management   Get up and walk a short distance to relieve pain and stiffness.  Place ice wrap on your hip except when you are walking.  The gel ice packs should be changed about every 4 hours   Elevate your leg on pillows for 15-30 minutes    Take Tylenol 650mg (take two 325mg tablets) every 6 hours for pain.  If needed, take a narcotic pain pill every 4-6 hours as prescribed.  Take all medications with a small amount of food.  As your pain decreases, take the narcotics less often or take ½ of a pill   Call Dr. Shaina Schneider if you have side effects from your narcotic pain medication: itching, drowsiness, dizziness, upset stomach, dry mouth, constipation or if you medication is not relieving your pain. Diet after surgery   You may resume your normal diet. Include vegetables, fruit, whole grains, lean meats, and low-fat dairy products. Eat food high in fiber    Drink plenty of fluids, including 8 cups of water daily   Take Senokot-s twice a day to prevent constipation    Avoid after surgery   Do not take any over-the-counter medication for pain except Tylenol   Do not take more than 3000mg (3 grams) of Tylenol in 24 hours.  Do not drink alcoholic beverages   Do not smoke   Do not drive until seen for follow up appointment   Do not place frozen gel pack directly on your skin. It can cause frostbite.  Do not take a tub bath, swim or get in a hot tub for 6 weeks  Prevention of falls and safety at home   Set up an area where you can rest comfortably leaving space around furniture to allow you to walk with your walker   Keep stairs, hallways and bathrooms well lit; especially at night   Arrange for care for your pets   Keep your home free of clutter        Call Dr. Shaina Schneider at [phone] for:   Pain that is not relieved by pain medication, ice and activity   Side effects of medications   Increased/spread of bruising   Warning signs of infection:  ? persistent fever greater than 100 degrees  ?  shaking or chills  ? increased redness, tenderness, swelling or drainage from incision  ? increased pain during activity or rest   Warning signs of a blood clot in your leg:  ? increased pain in your calf  ? tenderness or redness  ? increased swelling or knee, calf, ankle or foot    Call [phone] after 5pm or on a weekend.  The on call physician will return your phone call      Call your Primary Care Doctor for:    Concerns about your medical conditions such as diabetes, high blood pressure, asthma, congestive heart failure   Blood sugars greater than 180   Persistent headache or dizziness   Coughing or congestion   Constipation or diarrhea   Burning when you go to the bathroom   Abnormal heart rate (fast or slow)      Call 911 and go to the nearest hospital for:    Sudden increased shortness of breath   Sudden onset of chest pain   Difficulty breathing   Localized chest pain with coughing or taking a deep breath

## 2022-03-15 NOTE — PROGRESS NOTES
Problem: Falls - Risk of  Goal: *Absence of Falls  Description: Document [place]bell Fail Fall Risk and appropriate interventions in the flowsheet.   Outcome: Progressing Towards Goal  Note: Fall Risk Interventions:  Mobility Interventions: Communicate number of staff needed for ambulation/transfer,Patient to call before getting OOB,Utilize walker, cane, or other assistive device         Medication Interventions: Evaluate medications/consider consulting pharmacy,Patient to call before getting OOB,Teach patient to arise slowly                   Problem: Patient Education: Go to Patient Education Activity  Goal: Patient/Family Education  Outcome: Progressing Towards Goal     Problem: Hip Replacement: Day of Surgery/Unit  Goal: Off Pathway (Use only if patient is Off Pathway)  Outcome: Progressing Towards Goal  Goal: Activity/Safety  Outcome: Progressing Towards Goal  Goal: Consults, if ordered  Outcome: Progressing Towards Goal  Goal: Diagnostic Test/Procedures  Outcome: Progressing Towards Goal  Goal: Nutrition/Diet  Outcome: Progressing Towards Goal  Goal: Medications  Outcome: Progressing Towards Goal  Goal: Respiratory  Outcome: Progressing Towards Goal  Goal: Treatments/Interventions/Procedures  Outcome: Progressing Towards Goal  Goal: Psychosocial  Outcome: Progressing Towards Goal  Goal: *Initiate mobility  Outcome: Progressing Towards Goal  Goal: *Optimal pain control at patient's stated goal  Outcome: Progressing Towards Goal  Goal: *Hemodynamically stable  Outcome: Progressing Towards Goal

## 2022-03-21 DIAGNOSIS — Z96.641 S/P HIP REPLACEMENT, RIGHT: ICD-10-CM

## 2022-03-21 RX ORDER — OXYCODONE HYDROCHLORIDE 5 MG/1
5 TABLET ORAL
Qty: 30 TABLET | Refills: 0 | Status: SHIPPED | OUTPATIENT
Start: 2022-03-21 | End: 2022-03-24

## 2022-03-25 ENCOUNTER — DOCUMENTATION ONLY (OUTPATIENT)
Dept: ORTHOPEDIC SURGERY | Age: 42
End: 2022-03-25

## 2022-04-08 ENCOUNTER — OFFICE VISIT (OUTPATIENT)
Dept: ORTHOPEDIC SURGERY | Age: 42
End: 2022-04-08
Payer: COMMERCIAL

## 2022-04-08 VITALS — HEIGHT: 70 IN | BODY MASS INDEX: 28.63 KG/M2 | WEIGHT: 200 LBS

## 2022-04-08 DIAGNOSIS — Z96.641 STATUS POST TOTAL REPLACEMENT OF RIGHT HIP: Primary | ICD-10-CM

## 2022-04-08 PROCEDURE — 99024 POSTOP FOLLOW-UP VISIT: CPT | Performed by: PHYSICIAN ASSISTANT

## 2022-04-08 NOTE — LETTER
4/8/2022 3:04 PM    Ms. [address] [facility] [phone]      To whom it may concern,      This patient is under the care Dr. David Martinez and has a right total hip replacement and right total knee replacement in place.         Sincerely,          Malathi Payne PA-C

## 2022-04-08 NOTE — PROGRESS NOTES
Liu Ng (: 1980) is a 39 y.o. female, established patient, here for evaluation of the following chief complaint(s):  Surgical Follow-up       SUBJECTIVE/OBJECTIVE:    Liu Ng (: 1980) is a 39 y.o. female. Right Conversion To A Total Hip - Right 3/15/2022     The patient is pleased with the results of her surgery and the progress which she has made to date. Allergies   Allergen Reactions    Zithromax [Azithromycin] Hives       Current Outpatient Medications   Medication Sig    aspirin delayed-release 325 mg tablet Take 1 Tablet by mouth two (2) times a day for 30 days. Indications: post op DVT prevention    busPIRone (BUSPAR) 10 mg tablet Take 10 mg by mouth nightly.  traZODone (DESYREL) 150 mg tablet Take 150 mg by mouth nightly.  montelukast (SINGULAIR) 10 mg tablet Take 10 mg by mouth daily.  fluticasone propionate (Flonase Allergy Relief) 50 mcg/actuation nasal spray 2 Sprays by Both Nostrils route as needed.  losartan (COZAAR) 50 mg tablet Take 50 mg by mouth daily.  escitalopram oxalate (Lexapro) 10 mg tablet Take 20 mg by mouth nightly.  dextroamphetamine-amphetamine (AdderalL) 20 mg tablet Take 30 mg by mouth daily. No current facility-administered medications for this visit.        Social History     Socioeconomic History    Marital status: SINGLE     Spouse name: Not on file    Number of children: Not on file    Years of education: Not on file    Highest education level: Not on file   Occupational History    Not on file   Tobacco Use    Smoking status: Light Tobacco Smoker     Years: 10.00    Smokeless tobacco: Never Used    Tobacco comment: SMOKES 10 CIGARRETTES A WEEK   Vaping Use    Vaping Use: Former    Substances: Nicotine   Substance and Sexual Activity    Alcohol use: Yes     Comment: OCCASIONALLY    Drug use: Not Currently    Sexual activity: Not on file   Other Topics Concern    Not on file   Social History Narrative    Not on file     Social Determinants of Health     Financial Resource Strain:     Difficulty of Paying Living Expenses: Not on file   Food Insecurity:     Worried About Running Out of Food in the Last Year: Not on file    [place] of Food in the Last Year: Not on file   Transportation Needs:     Lack of Transportation (Medical): Not on file    Lack of Transportation (Non-Medical): Not on file   Physical Activity:     Days of Exercise per Week: Not on file    Minutes of Exercise per Session: Not on file   Stress:     Feeling of Stress : Not on file   Social Connections:     Frequency of Communication with Friends and Family: Not on file    Frequency of Social Gatherings with Friends and Family: Not on file    Attends Holiness Services: Not on file    Active Member of [address] [place] [address] [employer] or Organizations: Not on file    Attends Club or Organization Meetings: Not on file    Marital Status: Not on file   Intimate Partner Violence:     Fear of Current or Ex-Partner: Not on file    Emotionally Abused: Not on file    Physically Abused: Not on file    Sexually Abused: Not on file   Housing Stability:     Unable to Pay for Housing in the Last Year: Not on file    Number of [place] in the Last Year: Not on file    Unstable Housing in the Last Year: Not on file       Past Surgical History:   Procedure Laterality Date    HX GI  2008    COLONOSCOPY AND ENDOSCOPY    HX KNEE ARTHROSCOPY Right 2017    HX KNEE REPLACEMENT Right 06/2020    HX ORTHOPAEDIC Right 2021    [id number] [facility]    HX TUBAL LIGATION  2010       Family History   Problem Relation Age of Onset   James Arthritis-rheumatoid Mother     No Known Problems Father     No Known Problems Sister     No Known Problems Brother     Asthma Sister     No Known Problems Sister     No Known Problems Brother     No Known Problems Brother     Anesth Problems Neg Hx         OB History    No obstetric history on file.             REVIEW OF SYSTEMS:    Patient denies any recent fever, chills, nausea, vomiting, chest pain, or shortness of breath. Vitals:    Ht 5' 10\" (1.778 m)   Wt 200 lb (90.7 kg)   BMI 28.70 kg/m²    Body mass index is 28.7 kg/m². PHYSICAL EXAM:    The patient is alert and oriented x3 and in no acute distress. The postoperative wound is well-healed without erythema or drainage. There is pain free ROM of the operative hip. There is no calf tenderness to palpation. Distal motor and sensation is intact. IMAGING:    Results from Appointment encounter on 04/08/22    XR HIP RT W OR WO PELV 2-3 VWS    Narrative  AP pelvis, AP and frog lateral digital view radiographs of the right hip were obtained in the office today and reviewed with the patient and demonstrate anatomic alignment of components with no evidence of hardware loosening or subsidence. Orders Placed This Encounter    XR HIP RT W OR WO PELV 2-3 VWS     Standing Status:   Future     Number of Occurrences:   1     Standing Expiration Date:   4/9/2023     Order Specific Question:   Is Patient Pregnant? Answer:   Unknown    REFERRAL TO PHYSICAL THERAPY     Referral Priority:   Routine     Referral Type:   PT/OT/ST     Referral Reason:   Specialty Services Required     Requested Specialty:   Physical Therapy     Number of Visits Requested:   1          ASSESSMENT/PLAN:      1. Status post total replacement of right hip  -     XR HIP RT W OR WO PELV 2-3 VWS; Future  -     REFERRAL TO PHYSICAL THERAPY        Below is the assessment and plan developed based on review of pertinent history, physical exam, labs, studies, and medications. Have discussed radiographic findings and answered all patient questions to her satisfaction. Have provided the patient with a prescription for outpatient physical therapy for range of motion, strengthening and gait training.   The patient was asked to follow up in 8 weeks time for reevaluation with Dr Иван Guevara, sooner should she develop any surgery related complications. The patient should remain out of work until her follow-up appointment. The patient was asked to contact the office with any questions or concerns. The patient understands and agrees to the treatment plan as outlined above. Return in about 2 months (around 6/8/2022). Dr. Almazan Session was available for immediate consultation as needed. An electronic signature was used to authenticate this note.   -- Mary Pimentel PA-C

## 2022-06-08 ENCOUNTER — OFFICE VISIT (OUTPATIENT)
Dept: ORTHOPEDIC SURGERY | Age: 42
End: 2022-06-08
Payer: COMMERCIAL

## 2022-06-08 VITALS — BODY MASS INDEX: 28.63 KG/M2 | HEIGHT: 70 IN | WEIGHT: 200 LBS

## 2022-06-08 DIAGNOSIS — Z96.641 STATUS POST RIGHT HIP REPLACEMENT: Primary | ICD-10-CM

## 2022-06-08 PROCEDURE — 99024 POSTOP FOLLOW-UP VISIT: CPT | Performed by: ORTHOPAEDIC SURGERY

## 2022-06-08 NOTE — PROGRESS NOTES
Ijeoma Pulliam (: 1980) is a 39 y.o. female, patient, here for evaluation of the following chief complaint(s):  Surgical Follow-up (second post op follow up, right conversion to a total hip 3/15/2022)       HPI:    Doing well post right total hip no issues. Allergies   Allergen Reactions    Zithromax [Azithromycin] Hives       Current Outpatient Medications   Medication Sig    busPIRone (BUSPAR) 10 mg tablet Take 10 mg by mouth nightly.  traZODone (DESYREL) 150 mg tablet Take 150 mg by mouth nightly.  montelukast (SINGULAIR) 10 mg tablet Take 10 mg by mouth daily.  fluticasone propionate (Flonase Allergy Relief) 50 mcg/actuation nasal spray 2 Sprays by Both Nostrils route as needed.  losartan (COZAAR) 50 mg tablet Take 50 mg by mouth daily.  escitalopram oxalate (Lexapro) 10 mg tablet Take 20 mg by mouth nightly.  dextroamphetamine-amphetamine (AdderalL) 20 mg tablet Take 30 mg by mouth daily. No current facility-administered medications for this visit.        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    PUD (peptic ulcer disease) 2008    Scoliosis         Past Surgical History:   Procedure Laterality Date    HX GI  2008    COLONOSCOPY AND ENDOSCOPY    HX KNEE ARTHROSCOPY Right 2017    HX KNEE REPLACEMENT Right 2020    HX ORTHOPAEDIC Right     LABRIUM REPAIR HIP    HX TUBAL LIGATION  2010       Family History   Problem Relation Age of Onset   Rainer Britt Arthritis-rheumatoid Mother     No Known Problems Father     No Known Problems Sister     No Known Problems Brother     Asthma Sister     No Known Problems Sister     No Known Problems Brother     No Known Problems Brother     Anesth Problems Neg Hx         Social History     Socioeconomic History    Marital status: SINGLE     Spouse name: Not on file    Number of children: Not on file    Years of education: Not on file    Highest education level: Not on file   Occupational History    Not on file   Tobacco Use    Smoking status: Light Tobacco Smoker     Years: 10.00    Smokeless tobacco: Never Used    Tobacco comment: SMOKES 10 CIGARRETTES A WEEK   Vaping Use    Vaping Use: Former    Substances: Nicotine   Substance and Sexual Activity    Alcohol use: Yes     Comment: OCCASIONALLY    Drug use: Not Currently    Sexual activity: Not on file   Other Topics Concern    Not on file   Social History Narrative    Not on file     Social Determinants of Health     Financial Resource Strain:     Difficulty of Paying Living Expenses: Not on file   Food Insecurity:     Worried About [employer] [employer] in the Last Year: Not on file    [address] [employer] in the Last Year: Not on file   Transportation Needs:     Lack of Transportation (Medical): Not on file    Lack of Transportation (Non-Medical):  Not on file   Physical Activity:     Days of Exercise per Week: Not on file    Minutes of Exercise per Session: Not on file   Stress:     Feeling of Stress : Not on file   Social Connections:     Frequency of Communication with Friends and Family: Not on file    Frequency of Social Gatherings with Friends and Family: Not on file    Attends Shinto Services: Not on file    Active Member of [address] [place] [address] or Organizations: Not on file    Attends Club or Organization Meetings: Not on file    Marital Status: Not on file   Intimate Partner Violence:     Fear of Current or Ex-Partner: Not on file    Emotionally Abused: Not on file    Physically Abused: Not on file    Sexually Abused: Not on file   Housing Stability:     Unable to Pay for Housing in the Last Year: Not on file    Number of [place] in the Last Year: Not on file    Unstable Housing in the Last Year: Not on file       ROS     Positive for: Musculoskeletal    Last edited by Parish Gustavo Hess on 6/8/2022 11:35 AM. (History)            Vitals:  Ht 5' 10\" (1.778 m)   Wt 200 lb (90.7 kg)   BMI 28.70 kg/m²    Body mass index is 28.7 kg/m². PHYSICAL EXAM:  Exam today her right hip incisions well-healed. Ambulates no assistive devices. Leg lengths are equal.    IMAGING:  None    ASSESSMENT/PLAN:  1. Status post right hip replacement    Doing well post right total hip. Follow-up 1 year. Questions were answered. An electronic signature was used to authenticate this note.   --Elgin Matamoros MD

## (undated) DEVICE — STERILE POLYISOPRENE POWDER-FREE SURGICAL GLOVES WITH EMOLLIENT COATING: Brand: PROTEXIS

## (undated) DEVICE — DYONICS 25 INFLOW TUBE SET, 3 PER BOX

## (undated) DEVICE — SCRUB DRY SURG EZ SCRUB BRUSH PREOPERATIVE GRN

## (undated) DEVICE — REM POLYHESIVE ADULT PATIENT RETURN ELECTRODE: Brand: VALLEYLAB

## (undated) DEVICE — 5.5 MM LONG ABRADER SHAVER BLADES                                    AND BURRS, POWER/EP-1, 18 CM WORKING                                    LENGTH,BLACK,PACKAGED 3 PER BOX, STERILE

## (undated) DEVICE — SYR LR LCK 1ML GRAD NSAF 30ML --

## (undated) DEVICE — SUTURE MCRYL SZ 4-0 L27IN ABSRB UD L24MM PS-1 3/8 CIR PRIM Y935H

## (undated) DEVICE — SOLUTION IRRIG 1000ML H2O STRL BLT

## (undated) DEVICE — Device

## (undated) DEVICE — NANOPASS REACH CRESCENT: Brand: NANOPASS

## (undated) DEVICE — KIT EVAC 0.13IN RECT TB DIA10FR 400CC PVC 3 SPR Y CONN DRN

## (undated) DEVICE — KIT INSTR HIP DISP FOR 2.9MM SYS PUSHLOCK

## (undated) DEVICE — SYSTEM SKIN CLSR 22CM DERMBND PRINEO

## (undated) DEVICE — BOWL BNE CEM MIX SPAT CURET SMARTMIX CTS

## (undated) DEVICE — SOL IRR L R 3000ML BG --

## (undated) DEVICE — NEEDLE HYPO 18GA L1.5IN PNK S STL HUB POLYPR SHLD REG BVL

## (undated) DEVICE — STERILE POLYISOPRENE POWDER-FREE SURGICAL GLOVES: Brand: PROTEXIS

## (undated) DEVICE — NDL SPNE QNCKE 18GX3.5IN LF --

## (undated) DEVICE — SUTURE VCRL 2-0 L27IN ABSRB CT BRAID COAT UD J275H

## (undated) DEVICE — TUBING, SUCTION, 1/4" X 12', STRAIGHT: Brand: MEDLINE

## (undated) DEVICE — 3M™ IOBAN™ 2 ANTIMICROBIAL INCISE DRAPE 6651EZ: Brand: IOBAN™ 2

## (undated) DEVICE — SOLUTION IRRIG 3000ML 0.9% SOD CHL FLX CONT 0797208] ICU MEDICAL INC]

## (undated) DEVICE — BLADE RMFG SAG LG 19.1X70X1MM --

## (undated) DEVICE — TUBING PMP IRRIG GOFLO

## (undated) DEVICE — BANDAGE COMPR M W6INXL10YD WHT BGE VELC E MTRX HK AND LOOP

## (undated) DEVICE — Device: Brand: JELCO

## (undated) DEVICE — T4 HOOD

## (undated) DEVICE — PADDING CAST SPEC 6INX4YD COT --

## (undated) DEVICE — 4-PORT MANIFOLD: Brand: NEPTUNE 2

## (undated) DEVICE — BLADE SAW W083XL354IN THK0047IN CUT THK0047IN SAG FLR

## (undated) DEVICE — PADDING CST 4INX4YD --

## (undated) DEVICE — INFECTION CONTROL KIT SYS

## (undated) DEVICE — HYPODERMIC SAFETY NEEDLE: Brand: MAGELLAN

## (undated) DEVICE — Z DISCONTINUED USE 2744636  DRESSING AQUACEL 14 IN ALG W3.5XL14IN POLYUR FLM CVR W/ HYDRCOLL

## (undated) DEVICE — MIXING SYS CEM BNE VAC -- QUICKVAC 15/BX

## (undated) DEVICE — ZIMMER® STERILE DISPOSABLE TOURNIQUET CUFF WITH PLC, DUAL PORT, SINGLE BLADDER, 24 IN. (61 CM)

## (undated) DEVICE — SYR 10ML LUER LOK 1/5ML GRAD --

## (undated) DEVICE — MARKER,SKIN,WI/RULER AND LABELS: Brand: MEDLINE

## (undated) DEVICE — DRAPE XR C ARM 41X74IN LF --

## (undated) DEVICE — SUTURE VCRL SZ 2 L54IN ABSRB UD L65MM TP-1 1/2 CIR J880T

## (undated) DEVICE — DRAPE,EXTREMITY,89X128,STERILE: Brand: MEDLINE

## (undated) DEVICE — 3.5 MM STARVAC 90 INTEGRATED CABLE WAND ICW, 90 DEGREE: Brand: COBLATION

## (undated) DEVICE — SAMURAI BLADE FULL RADIUS: Brand: SAMURAI

## (undated) DEVICE — SUTURE VCRL SZ 2-0 L36IN ABSRB UD L40MM CT 1/2 CIR J957H

## (undated) DEVICE — SYR 20ML LL STRL LF --

## (undated) DEVICE — SUTURE FIBERWIRE SZ 2 L38IN NONABSORBABLE BLU WHT BLK AR7201

## (undated) DEVICE — PREP SKN PREVAIL 40ML APPL --

## (undated) DEVICE — 3M™ STERI-DRAPE™ U-DRAPE 1015: Brand: STERI-DRAPE™

## (undated) DEVICE — PAD,ABDOMINAL,5"X9",ST,LF,25/BX: Brand: MEDLINE INDUSTRIES, INC.

## (undated) DEVICE — SOLUTION IRRIG 3000ML 0.9% SOD CHL USP UROMATIC PLAS CONT

## (undated) DEVICE — PADDING CST 6IN STERILE --

## (undated) DEVICE — 4.5 MM INCISOR PLUS ELITE LONG                                    SHAVER BLADES AND BURRS, POWER/EP-1,                                    SLATE, 18 CM WORKING LENGTH,                                    PACKAGED 3 PER BOX, STERILE

## (undated) DEVICE — ERASECAUTI INTERMIT TRAY: Brand: MEDLINE INDUSTRIES, INC.

## (undated) DEVICE — SPONGE GZ W4XL4IN COT 12 PLY TYP VII WVN C FLD DSGN

## (undated) DEVICE — DRESSING HYDROCOLLOID BORDER 35X10 IN ALUM PRIMASEAL

## (undated) DEVICE — CONTAINER,SPECIMEN,3OZ,OR STRL: Brand: MEDLINE

## (undated) DEVICE — CONTAINER,SPECIMEN,4OZ,OR STRL: Brand: MEDLINE

## (undated) DEVICE — SOLUTION SURG PREP 26 CC PURPREP

## (undated) DEVICE — SUT ETHLN 3-0 18IN PS1 BLK --

## (undated) DEVICE — SPONGE LAPAROTOMY W4XL18IN WHT STRUNG RADPQ PREWASHED ST

## (undated) DEVICE — BLADE SAW W073XL276IN THK0031IN CUT THK0036IN REPL SAG

## (undated) DEVICE — SUTURE VCRL SZ 0 L36IN ABSRB VLT L40MM CT 1/2 CIR J358H

## (undated) DEVICE — GLOVE SURG SZ 8 L12IN FNGR THK94MIL STD WHT LTX FREE

## (undated) DEVICE — TAPE,CLOTH/SILK,CURAD,3"X10YD,LF,40/CS: Brand: CURAD

## (undated) DEVICE — NEEDLE HYPO 22GA L1.5IN BLK S STL HUB POLYPR SHLD REG BVL

## (undated) DEVICE — COVER,MAYO STAND,STERILE: Brand: MEDLINE

## (undated) DEVICE — HANDPIECE SET WITH BONE CLEANING TIP AND SUCTION TUBE: Brand: INTERPULSE

## (undated) DEVICE — BIT DRL L25MM DIA3.8MM FLX QUICKSET

## (undated) DEVICE — INTENDED FOR TISSUE SEPARATION, AND OTHER PROCEDURES THAT REQUIRE A SHARP SURGICAL BLADE TO PUNCTURE OR CUT.: Brand: BARD-PARKER ® CARBON RIB-BACK BLADES

## (undated) DEVICE — SOLUTION IRRIG 3000ML LAC R FLX CONT

## (undated) DEVICE — TOTAL TRAY, 16FR 10ML SIL FOLEY, URN: Brand: MEDLINE

## (undated) DEVICE — PAD BD MATTRESS 73X32 IN STD CONVOLUTED FOAM LTX FREE

## (undated) DEVICE — SMALL RIORDAN RASP 32.0MM

## (undated) DEVICE — ZIMMER® STERILE DISPOSABLE TOURNIQUET CUFF WITH PLC, DUAL PORT, SINGLE BLADDER, 34 IN. (86 CM)

## (undated) DEVICE — TOTAL JOINT - SMH: Brand: MEDLINE INDUSTRIES, INC.

## (undated) DEVICE — ASPIRATOR FLR L72IN SUCT TB W/ 1 DETACH FISH STK PUSH HNDL

## (undated) DEVICE — STRAP,POSITIONING,KNEE/BODY,FOAM,4X60": Brand: MEDLINE

## (undated) DEVICE — TOWEL SURG W17XL27IN STD BLU COT NONFENESTRATED PREWASHED

## (undated) DEVICE — SUTURE STRATAFIX SYMMETRIC PDS + SZ 1 L18IN ABSRB VLT L48MM SXPP1A400

## (undated) DEVICE — ARTHROSCOPIC NEEDLE STERILE

## (undated) DEVICE — CUSTOM CAST PD STR

## (undated) DEVICE — GLOVE ORTHO 8   MSG9480

## (undated) DEVICE — SOLUTION IRRIG 1000ML STRL H2O USP PLAS POUR BTL

## (undated) DEVICE — DRAPE,U/ SHT,SPLIT,PLAS,STERIL: Brand: MEDLINE

## (undated) DEVICE — SUTURE VCRL SZ 2-0 L27IN ABSRB UD L26MM SH 1/2 CIR J417H

## (undated) DEVICE — PACK,LAPAROTOMY,2 REINFORCED GOWNS: Brand: MEDLINE

## (undated) DEVICE — BANDAGE COMPR ELASTIC 5 YDX6 IN